# Patient Record
Sex: FEMALE | Race: WHITE | NOT HISPANIC OR LATINO | Employment: PART TIME | URBAN - METROPOLITAN AREA
[De-identification: names, ages, dates, MRNs, and addresses within clinical notes are randomized per-mention and may not be internally consistent; named-entity substitution may affect disease eponyms.]

---

## 2017-10-26 ENCOUNTER — COMMUNICATION - HEALTHEAST (OUTPATIENT)
Dept: TELEHEALTH | Facility: CLINIC | Age: 32
End: 2017-10-26

## 2017-10-26 ENCOUNTER — OFFICE VISIT - HEALTHEAST (OUTPATIENT)
Dept: FAMILY MEDICINE | Facility: CLINIC | Age: 32
End: 2017-10-26

## 2017-10-26 DIAGNOSIS — R31.9 BLOOD IN URINE: ICD-10-CM

## 2017-10-26 DIAGNOSIS — N39.0 UTI (URINARY TRACT INFECTION): ICD-10-CM

## 2017-10-26 ASSESSMENT — MIFFLIN-ST. JEOR: SCORE: 1337.71

## 2017-11-15 ENCOUNTER — OFFICE VISIT (OUTPATIENT)
Dept: OBGYN | Facility: CLINIC | Age: 32
End: 2017-11-15
Payer: COMMERCIAL

## 2017-11-15 VITALS
WEIGHT: 136 LBS | SYSTOLIC BLOOD PRESSURE: 120 MMHG | DIASTOLIC BLOOD PRESSURE: 80 MMHG | BODY MASS INDEX: 21.86 KG/M2 | HEIGHT: 66 IN

## 2017-11-15 DIAGNOSIS — Z01.411 ENCOUNTER FOR GYNECOLOGICAL EXAMINATION WITH ABNORMAL FINDING: Primary | ICD-10-CM

## 2017-11-15 DIAGNOSIS — R10.2 PELVIC PAIN IN FEMALE: ICD-10-CM

## 2017-11-15 PROCEDURE — 99385 PREV VISIT NEW AGE 18-39: CPT | Performed by: OBSTETRICS & GYNECOLOGY

## 2017-11-15 PROCEDURE — 99213 OFFICE O/P EST LOW 20 MIN: CPT | Mod: 25 | Performed by: OBSTETRICS & GYNECOLOGY

## 2017-11-15 RX ORDER — IBUPROFEN 100 MG/5ML
10 SUSPENSION, ORAL (FINAL DOSE FORM) ORAL EVERY 6 HOURS PRN
COMMUNITY
End: 2023-02-23

## 2017-11-15 NOTE — PROGRESS NOTES
Paula Larson is a 31 year old P0 female who presents for annual exam.  In addition to routine health maintenance, she also wishes to discuss pelvic pain, decreased libido.  20 minutes were spent in counseling regarding this issue (in addition to routine health maintenance.)   A Mirena was placed August 2016 (she had a substantial vasovagal response to this), had no periods x 4 months, but noted significant decreased libido.  About 4 months ago, she began having pelvic cramping, as well as spotting.  She took oral contraceptive pills in that past but would not be eager to resume use, just didn't feel well with them.  Would plan condoms if IUD removed,  ultimately plans vasectomy.  Moved back to [place] from [place], had pap there 8/2016, normal.      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.  Good general health, had surgery for thoracic outlet syndrome at age 16.  Occasional UTI.      Exam today normal, IUD string present.  Will plan ultrasound to localize IUD, assess adnexa.  Call for results.  If she wishes the IUD removed, would offer her Valium/Percocet prior to procedure (she knows she needs a ), and would plan it at [place].      Menses are irregular and normal lasting 1 days.  Menses flow: normal and 0.  No LMP recorded. Patient is not currently having periods (Reason: IUD).. Using IUD for contraception.  She is not currently considering pregnancy.  Besides routine health maintenance, she would like to discuss pelvic pain, IUD.    GYNECOLOGIC HISTORY:  Menarche:         Paula is sexually active with 1male partner(s) and is currently in monogamous relationship with .    History sexually transmitted infections:No STD history  STI testing offered?  Declined  ORLY exposure: No  History of abnormal Pap smear: NO - age 30- 65 PAP every 3 years recommended  Family history of breast CA: Yes (Please explain): grandmother  Family history of uterine/ovarian CA: No    Family  history of colon CA: Yes (Please explain): p grandfather    HEALTH MAINTENANCE:  Cholesterol: (No results found for: CHOL History of abnormal lipids: No  Mammo: 0 . History of abnormal Mammo: Not applicable.  Regular Self Breast Exams: Yes  Calcium/Vitamin D intake: source:  dairy, dietary supplement(s) Adequate? Yes  TSH: (No results found for: TSH )  Pap; (No results found for: PAP )    HISTORY:  Obstetric History     No data available        No past medical history on file.  No past surgical history on file.  No family history on file.  Social History     Social History     Marital status:      Spouse name: N/A     Number of children: N/A     Years of education: N/A     Social History Main Topics     Smoking status: Not on file     Smokeless tobacco: Not on file     Alcohol use Not on file     Drug use: Not on file     Sexual activity: Not on file     Other Topics Concern     Not on file     Social History Narrative       Current Outpatient Prescriptions:      levonorgestrel (MIRENA) 20 MCG/24HR IUD, 1 each by Intrauterine route once, Disp: , Rfl:      ibuprofen (ADVIL/MOTRIN) 100 MG/5ML suspension, Take 10 mg/kg by mouth every 6 hours as needed for fever or moderate pain, Disp: , Rfl:      Allergies   Allergen Reactions     Macrobid [Nitrofurantoin]      Sulfa Drugs        Past medical, surgical, social and family history were reviewed and updated in EPIC.    ROS:   C:     NEGATIVE for fever, chills, change in weight  I:       NEGATIVE for worrisome rashes, moles or lesions  E:     NEGATIVE for vision changes or irritation  E/M: NEGATIVE for ear, mouth and throat problems  R:     NEGATIVE for significant cough or SOB  CV:   NEGATIVE for chest pain, palpitations or peripheral edema  GI:     NEGATIVE for nausea, abdominal pain, heartburn, or change in bowel habits  :   NEGATIVE for frequency, dysuria, hematuria, vaginal discharge, or irregular bleeding  M:     NEGATIVE for significant arthralgias or  "myalgia  N:      NEGATIVE for weakness, dizziness or paresthesias  E:      NEGATIVE for temperature intolerance, skin/hair changes  P:      NEGATIVE for changes in mood or affect.    EXAM:  /80  Ht 5' 6\" (1.676 m)  Wt 136 lb (61.7 kg)  Breastfeeding? No  BMI 21.95 kg/m2   BMI: Body mass index is 21.95 kg/(m^2).  Constitutional: healthy, alert and no distress  Head: Normocephalic. No masses, lesions, tenderness or abnormalities  Neck: Neck supple. Trachea midline. No adenopathy. Thyroid symmetric, normal size.   Cardiovascular: RRR.   Respiratory: Negative.   Breast: No nodularity, asymmetry or nipple discharge bilaterally.  Gastrointestinal: Abdomen soft, non-tender, non-distended. No masses, organomegaly.  :  Vulva:  No external lesions, normal female hair distribution, no inguinal adenopathy.    Urethra:  Midline, non-tender, well supported, no discharge  Vagina:  Moist, pink, no abnormal discharge, no lesions  Uterus:  Normal size, non-tender, freely mobile  Ovaries:  No masses appreciated, non-tender, mobile  Some fullness in the right adnexa, no tenderness  Rectal Exam: deferred  Musculoskeletal: extremities normal  Skin: no suspicious lesions or rashes  Psychiatric: Affect appropriate, cooperative,mentation appears normal.     COUNSELING:      has no tobacco history on file.    Body mass index is 21.95 kg/(m^2).      FRAX Risk Assessment    ASSESSMENT:  31 year old female with satisfactory annual exam  (Z01.411) Encounter for gynecological examination with abnormal finding  (primary encounter diagnosis)  Comment:   Plan:     (R10.2) Pelvic pain in female  Comment:   Plan: US Pelvic Complete w Transvaginal            "

## 2017-11-15 NOTE — NURSING NOTE
"Chief Complaint   Patient presents with     Physical       Initial /80  Ht 5' 6\" (1.676 m)  Wt 136 lb (61.7 kg)  Breastfeeding? No  BMI 21.95 kg/m2 Estimated body mass index is 21.95 kg/(m^2) as calculated from the following:    Height as of this encounter: 5' 6\" (1.676 m).    Weight as of this encounter: 136 lb (61.7 kg).  BP completed using cuff size: regular    No obstetric history on file.    The following HM Due: NONE      The following patient reported/Care Every where data was sent to:  P ABSTRACT QUALITY INITIATIVES [[id number]]  none     n/a               "

## 2017-11-15 NOTE — MR AVS SNAPSHOT
"              After Visit Summary   11/15/2017    Paula Larson    MRN: [id number]           Patient Information     Date Of Birth          1985        Visit Information        Provider Department      11/15/2017 1:00 PM Anne Jang MD [facility]        Today's Diagnoses     Encounter for gynecological examination with abnormal finding    -  1    Pelvic pain in female           Follow-ups after your visit        Future tests that were ordered for you today     Open Future Orders        Priority Expected Expires Ordered    US Pelvic Complete w Transvaginal Routine  11/15/2018 11/15/2017            Who to contact     If you have questions or need follow up information about today's clinic visit or your schedule please contact [facility] directly at [phone].  Normal or non-critical lab and imaging results will be communicated to you by MyChart, letter or phone within 4 business days after the clinic has received the results. If you do not hear from us within 7 days, please contact the clinic through Ambarellahart or phone. If you have a critical or abnormal lab result, we will notify you by phone as soon as possible.  Submit refill requests through Adan or call your pharmacy and they will forward the refill request to us. Please allow 3 business days for your refill to be completed.          Additional Information About Your Visit        Ambarellahart Information     Adan lets you send messages to your doctor, view your test results, renew your prescriptions, schedule appointments and more. To sign up, go to [email or web address] . Click on \"Log in\" on the left side of the screen, which will take you to the Welcome page. Then click on \"Sign up Now\" on the right side of the page.     You will be asked to enter the access code listed below, as well as some personal information. Please follow the directions to create your username and password.     Your access code is: " "[email or web address]  Expires: 2018  3:06 PM     Your access code will  in 90 days. If you need help or a new code, please call your [place] clinic or [phone].        Care EveryWhere ID     This is your Care EveryWhere ID. This could be used by other organizations to access your [place] medical records  [id number]        Your Vitals Were     Height Breastfeeding? BMI (Body Mass Index)             5' 6\" (1.676 m) No 21.95 kg/m2          Blood Pressure from Last 3 Encounters:   11/15/17 120/80    Weight from Last 3 Encounters:   11/15/17 136 lb (61.7 kg)               Primary Care Provider    None Specified       No primary provider on file.        Equal Access to Services     JESSICA DEGROOT : Sammie He, wabillda luqadaha, qaybta kaalmada gerardoyada, bakari nicole . So [facility] [phone].    ATENCIÓN: Si habla español, tiene a boateng disposición servicios gratuitos de asistencia lingüística. Llame al [phone].    We comply with applicable federal civil rights laws and [place] laws. We do not discriminate on the basis of race, color, national origin, age, disability, sex, sexual orientation, or gender identity.            Thank you!     Thank you for choosing [facility]  for your care. Our goal is always to provide you with excellent care. Hearing back from our patients is one way we can continue to improve our services. Please take a few minutes to complete the written survey that you may receive in the mail after your visit with us. Thank you!             Your Updated Medication List - Protect others around you: Learn how to safely use, store and throw away your medicines at www.disposemymeds.org.          This list is accurate as of: 11/15/17  3:06 PM.  Always use your most recent med list.                   Brand Name Dispense Instructions for use Diagnosis    ibuprofen 100 MG/5ML suspension    ADVIL/MOTRIN     Take 10 mg/kg by mouth every 6 hours " as needed for fever or moderate pain        levonorgestrel 20 MCG/24HR IUD    MIRENA     1 each by Intrauterine route once

## 2017-11-21 ENCOUNTER — RADIANT APPOINTMENT (OUTPATIENT)
Dept: ULTRASOUND IMAGING | Facility: CLINIC | Age: 32
End: 2017-11-21
Attending: OBSTETRICS & GYNECOLOGY
Payer: COMMERCIAL

## 2017-11-21 DIAGNOSIS — R10.2 PELVIC PAIN IN FEMALE: ICD-10-CM

## 2017-11-21 PROCEDURE — 76830 TRANSVAGINAL US NON-OB: CPT | Performed by: OBSTETRICS & GYNECOLOGY

## 2017-12-07 ENCOUNTER — OFFICE VISIT (OUTPATIENT)
Dept: OBGYN | Facility: CLINIC | Age: 32
End: 2017-12-07
Payer: COMMERCIAL

## 2017-12-07 VITALS
WEIGHT: 140 LBS | SYSTOLIC BLOOD PRESSURE: 144 MMHG | BODY MASS INDEX: 22.6 KG/M2 | DIASTOLIC BLOOD PRESSURE: 94 MMHG | OXYGEN SATURATION: 100 % | HEART RATE: 80 BPM

## 2017-12-07 DIAGNOSIS — Z30.09 ENCOUNTER FOR OTHER GENERAL COUNSELING OR ADVICE ON CONTRACEPTION: ICD-10-CM

## 2017-12-07 DIAGNOSIS — F41.9 ANXIETY: Primary | ICD-10-CM

## 2017-12-07 DIAGNOSIS — D25.9 UTERINE LEIOMYOMA, UNSPECIFIED LOCATION: ICD-10-CM

## 2017-12-07 PROCEDURE — 99214 OFFICE O/P EST MOD 30 MIN: CPT | Performed by: OBSTETRICS & GYNECOLOGY

## 2017-12-07 RX ORDER — ACETAMINOPHEN AND CODEINE PHOSPHATE 120; 12 MG/5ML; MG/5ML
1 SOLUTION ORAL DAILY
Qty: 84 TABLET | Refills: 3 | Status: SHIPPED | OUTPATIENT
Start: 2017-12-07 | End: 2023-02-23

## 2017-12-07 RX ORDER — DIAZEPAM 10 MG
10 TABLET ORAL EVERY 6 HOURS PRN
Qty: 1 TABLET | Refills: 0 | Status: SHIPPED | OUTPATIENT
Start: 2017-12-07 | End: 2023-02-23

## 2017-12-07 RX ORDER — HYDROCODONE BITARTRATE AND ACETAMINOPHEN 5; 325 MG/1; MG/1
1-2 TABLET ORAL EVERY 4 HOURS PRN
Qty: 2 TABLET | Refills: 0 | Status: SHIPPED | OUTPATIENT
Start: 2017-12-07 | End: 2023-02-23

## 2017-12-07 NOTE — NURSING NOTE
"Chief Complaint   Patient presents with     RECHECK       Initial There were no vitals taken for this visit. Estimated body mass index is 21.95 kg/(m^2) as calculated from the following:    Height as of 11/15/17: 5' 6\" (1.676 m).    Weight as of 11/15/17: 136 lb (61.7 kg).  BP completed using cuff size: regular    No obstetric history on file.    The following HM Due: NONE      The following patient reported/Care Every where data was sent to:  P ABSTRACT QUALITY INITIATIVES [[id number]]  none    n/a              "

## 2017-12-07 NOTE — MR AVS SNAPSHOT
"              After Visit Summary   12/7/2017    Paula Larson    MRN: [id number]           Patient Information     Date Of Birth          1985        Visit Information        Provider Department      12/7/2017 9:45 AM Anne Jang MD [facility]        Today's Diagnoses     Anxiety    -  1    Encounter for other general counseling or advice on contraception        Uterine leiomyoma, unspecified location           Follow-ups after your visit        Future tests that were ordered for you today     Open Future Orders        Priority Expected Expires Ordered    US Pelvic Complete w Transvaginal Routine  12/7/2018 12/7/2017            Who to contact     If you have questions or need follow up information about today's clinic visit or your schedule please contact [facility] directly at [phone].  Normal or non-critical lab and imaging results will be communicated to you by Wanderahart, letter or phone within 4 business days after the clinic has received the results. If you do not hear from us within 7 days, please contact the clinic through Wanderahart or phone. If you have a critical or abnormal lab result, we will notify you by phone as soon as possible.  Submit refill requests through Anametrix or call your pharmacy and they will forward the refill request to us. Please allow 3 business days for your refill to be completed.          Additional Information About Your Visit        WanderaharEko Devices Information     Anametrix lets you send messages to your doctor, view your test results, renew your prescriptions, schedule appointments and more. To sign up, go to [email or web address] . Click on \"Log in\" on the left side of the screen, which will take you to the Welcome page. Then click on \"Sign up Now\" on the right side of the page.     You will be asked to enter the access code listed below, as well as some personal information. Please follow the directions to create your username and password.   "   Your access code is: [email or web address]  Expires: 2018  3:06 PM     Your access code will  in 90 days. If you need help or a new code, please call your [place] clinic or [phone].        Care EveryWhere ID     This is your Care EveryWhere ID. This could be used by other organizations to access your [place] medical records  [id number]        Your Vitals Were     Pulse Pulse Oximetry Breastfeeding? BMI (Body Mass Index)          80 100% No 22.6 kg/m2         Blood Pressure from Last 3 Encounters:   17 (!) 144/94   11/15/17 120/80    Weight from Last 3 Encounters:   17 140 lb (63.5 kg)   11/15/17 136 lb (61.7 kg)                 Today's Medication Changes          These changes are accurate as of: 17 11:21 AM.  If you have any questions, ask your nurse or doctor.               Start taking these medicines.        Dose/Directions    diazepam 10 MG tablet   Commonly known as:  VALIUM   Used for:  Anxiety   Started by:  Anne Jang MD        Dose:  10 mg   Take 1 tablet (10 mg) by mouth every 6 hours as needed for anxiety or sleep Take 30-60 minutes before procedure.  Do not operate a vehicle after taking this medication.   Quantity:  1 tablet   Refills:  0       HYDROcodone-acetaminophen 5-325 MG per tablet   Commonly known as:  NORCO   Used for:  Anxiety   Started by:  Anne Jang MD        Dose:  1-2 tablet   Take 1-2 tablets by mouth every 4 hours as needed for moderate to severe pain Take 1-2 tablets 30-60 minutes before procedure   Quantity:  2 tablet   Refills:  0       naloxone nasal spray   Commonly known as:  NARCAN   Used for:  Anxiety   Started by:  Anne Jang MD        Dose:  4 mg   Spray 1 spray (4 mg) into one nostril alternating nostrils as needed for opioid reversal every 2-3 minutes until assistance arrives   Quantity:  0.2 mL   Refills:  0       norethindrone 0.35 MG per tablet   Commonly known as:  MICRONOR   Used for:  Encounter for other general counseling or  advice on contraception   Started by:  Anne Jang MD        Dose:  1 tablet   Take 1 tablet (0.35 mg) by mouth daily   Quantity:  84 tablet   Refills:  3            Where to get your medicines      These medications were sent to [facility] - [place], [place] - [address]  [address], [place] [address]     Phone:  [phone]     naloxone nasal spray    norethindrone 0.35 MG per tablet         Some of these will need a paper prescription and others can be bought over the counter.  Ask your nurse if you have questions.     Bring a paper prescription for each of these medications     diazepam 10 MG tablet    HYDROcodone-acetaminophen 5-325 MG per tablet                Primary Care Provider Office Phone # Fax #    [facility] [phone] [address]  [facility] [address]        Equal Access to Services     JESSICA DEGROOT : Hadii aad ku hadasho Soliliana, waaxda luqadaha, qaybta kaalmada adejoyceyaye, bakari dupree. So [facility] [phone].    ATENCIÓN: Si habla español, tiene a boateng disposición servicios gratuitos de asistencia lingüística. Raghavendra al [phone].    We comply with applicable federal civil rights laws and [place] laws. We do not discriminate on the basis of race, color, national origin, age, disability, sex, sexual orientation, or gender identity.            Thank you!     Thank you for choosing [facility]  for your care. Our goal is always to provide you with excellent care. Hearing back from our patients is one way we can continue to improve our services. Please take a few minutes to complete the written survey that you may receive in the mail after your visit with us. Thank you!             Your Updated Medication List - Protect others around you: Learn how to safely use, store and throw away your medicines at www.disposemymeds.org.          This list is accurate as of: 12/7/17  11:21 AM.  Always use your most recent med list.                   Brand Name Dispense Instructions for use Diagnosis    diazepam 10 MG tablet    VALIUM    1 tablet    Take 1 tablet (10 mg) by mouth every 6 hours as needed for anxiety or sleep Take 30-60 minutes before procedure.  Do not operate a vehicle after taking this medication.    Anxiety       HYDROcodone-acetaminophen 5-325 MG per tablet    NORCO    2 tablet    Take 1-2 tablets by mouth every 4 hours as needed for moderate to severe pain Take 1-2 tablets 30-60 minutes before procedure    Anxiety       ibuprofen 100 MG/5ML suspension    ADVIL/MOTRIN     Take 10 mg/kg by mouth every 6 hours as needed for fever or moderate pain        levonorgestrel 20 MCG/24HR IUD    MIRENA     1 each by Intrauterine route once        naloxone nasal spray    NARCAN    0.2 mL    Spray 1 spray (4 mg) into one nostril alternating nostrils as needed for opioid reversal every 2-3 minutes until assistance arrives    Anxiety       norethindrone 0.35 MG per tablet    MICRONOR    84 tablet    Take 1 tablet (0.35 mg) by mouth daily    Encounter for other general counseling or advice on contraception

## 2017-12-07 NOTE — PROGRESS NOTES
SUBJECTIVE:  Paula Larson is a 32 year old  who presents to discuss pelvic pain, ultrasound.  See prior GYN note.  We reviewed the ultrasound done 17.  The ovaries were normal, the IUD correctly placed.  Multiple fibroids were seen, one 4-5 cm, two smaller.       OBJECTIVE: BP (!) 144/94  Pulse 80  Wt 140 lb (63.5 kg)  SpO2 100%  Breastfeeding? No  BMI 22.6 kg/m2 Patient was not otherwise examined.      ASSESSMENT: Pelvic pain.  Uterine fibroids.  IUD in place.      PLAN: Discussed fibroids, possible incidental finding, possibility for growth/bleeding problems.  Will plan ultrasound in about 6 months, call for the results, or call prn problems.  Discussed IUD.  Since the IUD has not really helped her pain despite lightening her bleeding, she would like it removed.  The insertion was painful, with vasovagal reaction.  She will premedicate with valium and Norco.  Will have a .      Please note greater than 50% of this 25 minute appointment were spent in counseling with the patient of the issues described above in the history of present illness and in the plan, including evaluation and managment of pelvic pain, fibroids, IUD.

## 2018-01-26 ENCOUNTER — OFFICE VISIT (OUTPATIENT)
Dept: OBGYN | Facility: CLINIC | Age: 33
End: 2018-01-26
Payer: COMMERCIAL

## 2018-01-26 VITALS
DIASTOLIC BLOOD PRESSURE: 98 MMHG | HEART RATE: 81 BPM | HEIGHT: 66 IN | OXYGEN SATURATION: 99 % | SYSTOLIC BLOOD PRESSURE: 144 MMHG | WEIGHT: 141 LBS | BODY MASS INDEX: 22.66 KG/M2

## 2018-01-26 DIAGNOSIS — Z30.432 ENCOUNTER FOR IUD REMOVAL: Primary | ICD-10-CM

## 2018-01-26 LAB — BETA HCG QUAL IFA URINE: NEGATIVE

## 2018-01-26 PROCEDURE — 84703 CHORIONIC GONADOTROPIN ASSAY: CPT | Performed by: OBSTETRICS & GYNECOLOGY

## 2018-01-26 PROCEDURE — 58301 REMOVE INTRAUTERINE DEVICE: CPT | Performed by: OBSTETRICS & GYNECOLOGY

## 2018-01-26 NOTE — PROGRESS NOTES
"    Paula Larson is a  who presents for IUD removal.  See prior notes.  She has taken valium and Norco (history of anxiety, has had multiple vagal reactions under stress).  Informed consent obtained.       OBJECTIVE:  Healthy female in NAD.  BP (!) 144/98  Pulse 81  Ht 5' 6\" (1.676 m)  Wt 141 lb (64 kg)  SpO2 99%  Breastfeeding? No  BMI 22.76 kg/m2     Speculum is placed in the vagina, and the IUD string is grasped with a ring forceps, and removed without difficulty.      She tolerated this well.     ASSESSMENT:  IUD removal.    PLAN: RTC as needed.   She plans to observe menses off hormonal contraception for a while, has rx for Micronor if she wishes to begin this.    "

## 2018-01-26 NOTE — NURSING NOTE
"Chief Complaint   Patient presents with     IUD     removal        Initial BP (!) 144/98  Pulse 81  Ht 5' 6\" (1.676 m)  Wt 141 lb (64 kg)  SpO2 99%  Breastfeeding? No  BMI 22.76 kg/m2 Estimated body mass index is 22.76 kg/(m^2) as calculated from the following:    Height as of this encounter: 5' 6\" (1.676 m).    Weight as of this encounter: 141 lb (64 kg).  BP completed using cuff size: regular        The following HM Due: NONE      The following patient reported/Care Every where data was sent to:  P ABSTRACT QUALITY INITIATIVES [[id number]]  none      n/a              "

## 2018-01-26 NOTE — MR AVS SNAPSHOT
"              After Visit Summary   2018    Paula Larson    MRN: [id number]           Patient Information     Date Of Birth          1985        Visit Information        Provider Department      2018 10:00 AM Anne Jang MD [facility]        Today's Diagnoses     Encounter for IUD removal    -  1       Follow-ups after your visit        Who to contact     If you have questions or need follow up information about today's clinic visit or your schedule please contact [facility] directly at [phone].  Normal or non-critical lab and imaging results will be communicated to you by MyChart, letter or phone within 4 business days after the clinic has received the results. If you do not hear from us within 7 days, please contact the clinic through [employer] or phone. If you have a critical or abnormal lab result, we will notify you by phone as soon as possible.  Submit refill requests through Equ[employer] or call your pharmacy and they will forward the refill request to us. Please allow 3 business days for your refill to be completed.          Additional Information About Your Visit        MyChart Information     [email or web address] lets you send messages to your doctor, view your test results, renew your prescriptions, schedule appointments and more. To sign up, go to [email or web address] . Click on \"Log in\" on the left side of the screen, which will take you to the Welcome page. Then click on \"Sign up Now\" on the right side of the page.     You will be asked to enter the access code listed below, as well as some personal information. Please follow the directions to create your username and password.     Your access code is: [email or web address]  Expires: 2018  3:06 PM     Your access code will  in 90 days. If you need help or a new code, please call your [facility] or [phone].        Care EveryWhere ID     This is your Care EveryWhere ID. This could be used by other " "organizations to access your [place] medical records  [id number]        Your Vitals Were     Pulse Height Pulse Oximetry Breastfeeding? BMI (Body Mass Index)       81 5' 6\" (1.676 m) 99% No 22.76 kg/m2        Blood Pressure from Last 3 Encounters:   01/26/18 (!) 144/98   12/07/17 (!) 144/94   11/15/17 120/80    Weight from Last 3 Encounters:   01/26/18 141 lb (64 kg)   12/07/17 140 lb (63.5 kg)   11/15/17 136 lb (61.7 kg)              We Performed the Following     Beta HCG qual IFA urine     REMOVE INTRAUTERINE DEVICE        Primary Care Provider Office Phone # Fax #    [facility] [phone] [address]  [facility] [address]        Equal Access to Services     JESSICA DEGROOT : Hadii marcellus brice Soliliana, waaxda luqadaha, qaybta kaalmada fernie, bakari nicole . So [facility] [phone].    ATENCIÓN: Si habla español, tiene a boateng disposición servicios gratuitos de asistencia lingüística. Raghavendra al [phone].    We comply with applicable federal civil rights laws and [place] laws. We do not discriminate on the basis of race, color, national origin, age, disability, sex, sexual orientation, or gender identity.            Thank you!     Thank you for choosing [facility]  for your care. Our goal is always to provide you with excellent care. Hearing back from our patients is one way we can continue to improve our services. Please take a few minutes to complete the written survey that you may receive in the mail after your visit with us. Thank you!             Your Updated Medication List - Protect others around you: Learn how to safely use, store and throw away your medicines at www.disposemymeds.org.          This list is accurate as of 1/26/18 10:28 AM.  Always use your most recent med list.                   Brand Name Dispense Instructions for use Diagnosis    diazepam 10 MG tablet    VALIUM    1 tablet    Take 1 tablet (10 mg) by " mouth every 6 hours as needed for anxiety or sleep Take 30-60 minutes before procedure.  Do not operate a vehicle after taking this medication.    Anxiety       HYDROcodone-acetaminophen 5-325 MG per tablet    NORCO    2 tablet    Take 1-2 tablets by mouth every 4 hours as needed for moderate to severe pain Take 1-2 tablets 30-60 minutes before procedure    Anxiety       ibuprofen 100 MG/5ML suspension    ADVIL/MOTRIN     Take 10 mg/kg by mouth every 6 hours as needed for fever or moderate pain        levonorgestrel 20 MCG/24HR IUD    MIRENA     1 each by Intrauterine route once        naloxone nasal spray    NARCAN    0.2 mL    Spray 1 spray (4 mg) into one nostril alternating nostrils as needed for opioid reversal every 2-3 minutes until assistance arrives    Anxiety       norethindrone 0.35 MG per tablet    MICRONOR    84 tablet    Take 1 tablet (0.35 mg) by mouth daily    Encounter for other general counseling or advice on contraception

## 2018-06-18 ENCOUNTER — OFFICE VISIT - HEALTHEAST (OUTPATIENT)
Dept: FAMILY MEDICINE | Facility: CLINIC | Age: 33
End: 2018-06-18

## 2018-06-18 DIAGNOSIS — N39.0 UTI (URINARY TRACT INFECTION): ICD-10-CM

## 2018-06-18 LAB
BACTERIA #/AREA URNS HPF: ABNORMAL HPF
RBC #/AREA URNS AUTO: ABNORMAL HPF
SQUAMOUS #/AREA URNS AUTO: ABNORMAL LPF
WBC #/AREA URNS AUTO: ABNORMAL HPF

## 2018-06-19 LAB — BACTERIA SPEC CULT: NO GROWTH

## 2018-06-20 ENCOUNTER — COMMUNICATION - HEALTHEAST (OUTPATIENT)
Dept: FAMILY MEDICINE | Facility: CLINIC | Age: 33
End: 2018-06-20

## 2018-11-22 ENCOUNTER — OFFICE VISIT - HEALTHEAST (OUTPATIENT)
Dept: FAMILY MEDICINE | Facility: CLINIC | Age: 33
End: 2018-11-22

## 2018-11-22 DIAGNOSIS — R30.0 DYSURIA: ICD-10-CM

## 2018-11-22 LAB
ALBUMIN UR-MCNC: NEGATIVE MG/DL
APPEARANCE UR: CLEAR
BILIRUB UR QL STRIP: NEGATIVE
COLOR UR AUTO: YELLOW
GLUCOSE UR STRIP-MCNC: NEGATIVE MG/DL
HGB UR QL STRIP: NEGATIVE
KETONES UR STRIP-MCNC: NEGATIVE MG/DL
LEUKOCYTE ESTERASE UR QL STRIP: NEGATIVE
NITRATE UR QL: NEGATIVE
PH UR STRIP: 7 [PH] (ref 5–8)
SP GR UR STRIP: 1.02 (ref 1–1.03)
UROBILINOGEN UR STRIP-ACNC: NORMAL

## 2018-11-23 LAB — BACTERIA SPEC CULT: NO GROWTH

## 2019-05-15 ENCOUNTER — OFFICE VISIT - HEALTHEAST (OUTPATIENT)
Dept: INTERNAL MEDICINE | Facility: CLINIC | Age: 34
End: 2019-05-15

## 2019-05-15 ENCOUNTER — COMMUNICATION - HEALTHEAST (OUTPATIENT)
Dept: INTERNAL MEDICINE | Facility: CLINIC | Age: 34
End: 2019-05-15

## 2019-05-15 ENCOUNTER — AMBULATORY - HEALTHEAST (OUTPATIENT)
Dept: MULTI SPECIALTY CLINIC | Facility: CLINIC | Age: 34
End: 2019-05-15

## 2019-05-15 ENCOUNTER — COMMUNICATION - HEALTHEAST (OUTPATIENT)
Dept: TELEHEALTH | Facility: CLINIC | Age: 34
End: 2019-05-15

## 2019-05-15 ENCOUNTER — OFFICE VISIT (OUTPATIENT)
Dept: FAMILY MEDICINE | Facility: CLINIC | Age: 34
End: 2019-05-15
Payer: COMMERCIAL

## 2019-05-15 VITALS
TEMPERATURE: 98.8 F | RESPIRATION RATE: 16 BRPM | DIASTOLIC BLOOD PRESSURE: 96 MMHG | BODY MASS INDEX: 21.38 KG/M2 | HEIGHT: 66 IN | OXYGEN SATURATION: 100 % | WEIGHT: 133 LBS | SYSTOLIC BLOOD PRESSURE: 143 MMHG | HEART RATE: 68 BPM

## 2019-05-15 DIAGNOSIS — N92.0 MENORRHAGIA WITH REGULAR CYCLE: ICD-10-CM

## 2019-05-15 DIAGNOSIS — N94.6 DYSMENORRHEA: ICD-10-CM

## 2019-05-15 DIAGNOSIS — G43.009 NONINTRACTABLE MIGRAINE, UNSPECIFIED MIGRAINE TYPE: ICD-10-CM

## 2019-05-15 DIAGNOSIS — Z23 NEED FOR TETANUS BOOSTER: ICD-10-CM

## 2019-05-15 DIAGNOSIS — Z13.220 SCREENING FOR HYPERLIPIDEMIA: ICD-10-CM

## 2019-05-15 DIAGNOSIS — R03.0 ELEVATED BP WITHOUT DIAGNOSIS OF HYPERTENSION: ICD-10-CM

## 2019-05-15 DIAGNOSIS — Z80.0 FAMILY HISTORY OF COLON CANCER: ICD-10-CM

## 2019-05-15 DIAGNOSIS — Z84.1 FAMILY HISTORY OF PRIMARY IGA NEPHROPATHY: ICD-10-CM

## 2019-05-15 DIAGNOSIS — N39.0 FREQUENT UTI: Primary | ICD-10-CM

## 2019-05-15 DIAGNOSIS — Z87.440 HISTORY OF RECURRENT UTIS: ICD-10-CM

## 2019-05-15 DIAGNOSIS — L50.9 HIVES: ICD-10-CM

## 2019-05-15 DIAGNOSIS — T75.3XXS MOTION SICKNESS, SEQUELA: ICD-10-CM

## 2019-05-15 DIAGNOSIS — Z12.4 SCREENING FOR CERVICAL CANCER: ICD-10-CM

## 2019-05-15 DIAGNOSIS — D21.9 FIBROIDS: ICD-10-CM

## 2019-05-15 DIAGNOSIS — Z00.00 ROUTINE GENERAL MEDICAL EXAMINATION AT A HEALTH CARE FACILITY: ICD-10-CM

## 2019-05-15 DIAGNOSIS — D68.51 HETEROZYGOUS FACTOR V LEIDEN MUTATION (H): ICD-10-CM

## 2019-05-15 LAB
ALBUMIN UR-MCNC: NEGATIVE MG/DL
ANION GAP SERPL CALCULATED.3IONS-SCNC: 13 MMOL/L (ref 5–18)
APPEARANCE UR: CLEAR
BILIRUB UR QL STRIP: NEGATIVE
BUN SERPL-MCNC: 16 MG/DL (ref 8–22)
CALCIUM SERPL-MCNC: 10.2 MG/DL (ref 8.5–10.5)
CHLORIDE BLD-SCNC: 104 MMOL/L (ref 98–107)
CO2 SERPL-SCNC: 22 MMOL/L (ref 22–31)
COLOR UR AUTO: NORMAL
CREAT SERPL-MCNC: 0.75 MG/DL (ref 0.6–1.1)
ERYTHROCYTE [DISTWIDTH] IN BLOOD BY AUTOMATED COUNT: 12.2 % (ref 11–14.5)
FERRITIN SERPL-MCNC: 84 NG/ML (ref 10–130)
GFR SERPL CREATININE-BSD FRML MDRD: >60 ML/MIN/1.73M2
GLUCOSE BLD-MCNC: 90 MG/DL (ref 70–125)
GLUCOSE UR STRIP-MCNC: NEGATIVE MG/DL
HCT VFR BLD AUTO: 38.6 % (ref 35–47)
HGB BLD-MCNC: 12.7 G/DL (ref 12–16)
HGB UR QL STRIP: NEGATIVE
KETONES UR STRIP-MCNC: NEGATIVE MG/DL
LEUKOCYTE ESTERASE UR QL STRIP: NEGATIVE
MCH RBC QN AUTO: 31.4 PG (ref 27–34)
MCHC RBC AUTO-ENTMCNC: 32.9 G/DL (ref 32–36)
MCV RBC AUTO: 96 FL (ref 80–100)
NITRATE UR QL: NEGATIVE
PAP SMEAR - HIM PATIENT REPORTED: NORMAL
PH UR STRIP: 6 [PH] (ref 4.5–8)
PLATELET # BLD AUTO: 243 THOU/UL (ref 140–440)
PMV BLD AUTO: 11.4 FL (ref 8.5–12.5)
POTASSIUM BLD-SCNC: 3.8 MMOL/L (ref 3.5–5)
RBC # BLD AUTO: 4.04 MILL/UL (ref 3.8–5.4)
SODIUM SERPL-SCNC: 139 MMOL/L (ref 136–145)
SP GR UR STRIP: 1.01 (ref 1–1.03)
TSH SERPL DL<=0.005 MIU/L-ACNC: 2.85 UIU/ML (ref 0.3–5)
UROBILINOGEN UR STRIP-ACNC: NORMAL
WBC: 10.7 THOU/UL (ref 4–11)

## 2019-05-15 PROCEDURE — 90714 TD VACC NO PRESV 7 YRS+ IM: CPT | Performed by: FAMILY MEDICINE

## 2019-05-15 PROCEDURE — 99204 OFFICE O/P NEW MOD 45 MIN: CPT | Mod: 25 | Performed by: FAMILY MEDICINE

## 2019-05-15 PROCEDURE — G0145 SCR C/V CYTO,THINLAYER,RESCR: HCPCS | Performed by: FAMILY MEDICINE

## 2019-05-15 PROCEDURE — 90471 IMMUNIZATION ADMIN: CPT | Performed by: FAMILY MEDICINE

## 2019-05-15 PROCEDURE — 87624 HPV HI-RISK TYP POOLED RSLT: CPT | Performed by: FAMILY MEDICINE

## 2019-05-15 RX ORDER — FEXOFENADINE HCL 180 MG/1
180 TABLET ORAL DAILY
COMMUNITY

## 2019-05-15 ASSESSMENT — MIFFLIN-ST. JEOR
SCORE: 1321.06
SCORE: 1333.18

## 2019-05-15 NOTE — PROGRESS NOTES
SUBJECTIVE:   Paula Larson is a 33 year old female who presents to clinic today for the following   health issues:    Pt is here for a pap only and breast exam. Requesting medication     1.  Due for pap smear.  Last was 2016.  Denies history of abnormal paps.  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     2.  Frequent UTIs: has used post-coital keflex in the past with good results, requests a prescription.  Has had 2 UTIs in the past 6 months.  [place] seems to be the main trigger.  She denies any h/o pyelonephritis or urological surgeries.      3.  Dysmenorrhea; she reports she has always had heavy, painful periods which result it significant discomfort and even fainting.  She states she has been fainting in bathrooms since age 11.  She tried OCPs but did not like the way they made her feel.  She had an IUD for a year or a little longer but she had ongoing cramping (though it did lighten the bleeding) which was not tolerable, so the IUD was removed.  She and her  do not wish to have children.  She is wondering about a surgical or other option.      4.  Due for tetanus: she had Tdap in 2008; after this she got an enlarged lymph node and her mother reminded her that she has had an allergy to the pertussis component in the past.     5.  Colon cancer in paternal grandfather at 42--no other colon cancer in the family, father has had regular colonoscopies and has not had any polyps.  Wondering if she needs to screen early.     6.  Elevated BP: she feels anxious at the doctor's office.  Hasn't checked BP outside of a clinic.   She is a pharmacist.    Additional history: as documented    Reviewed  and updated as needed this visit by clinical staff  Tobacco  Allergies  Meds  Med Hx  Surg Hx  Fam Hx  Soc Hx        Reviewed and updated as needed this visit by Provider         There is no problem list on file for this patient.    History reviewed. No  pertinent surgical history.    Social History     Tobacco Use     Smoking status: Never Smoker     Smokeless tobacco: Never Used   Substance Use Topics     Alcohol use: Yes     Family History   Problem Relation Age of Onset     IgA nephropathy Mother      Coronary Artery Disease Early Onset Father      Hypertension Father      Melanoma Sister          Current Outpatient Medications   Medication Sig Dispense Refill     Calcium-Magnesium-Vitamin D (CALCIUM 500 PO)        cephALEXin (KEFLEX) 250 MG capsule Take 1 cap by mouth once daily as needed for UTI prevention. 30 capsule 1     fexofenadine (ALLEGRA) 180 MG tablet Take 180 mg by mouth daily       vitamin D3 (CHOLECALCIFEROL) 36791 units (250 mcg) capsule Take 1 capsule by mouth daily       diazepam (VALIUM) 10 MG tablet Take 1 tablet (10 mg) by mouth every 6 hours as needed for anxiety or sleep Take 30-60 minutes before procedure.  Do not operate a vehicle after taking this medication. (Patient not taking: Reported on 1/26/2018) 1 tablet 0     HYDROcodone-acetaminophen (NORCO) 5-325 MG per tablet Take 1-2 tablets by mouth every 4 hours as needed for moderate to severe pain Take 1-2 tablets 30-60 minutes before procedure (Patient not taking: Reported on 1/26/2018) 2 tablet 0     ibuprofen (ADVIL/MOTRIN) 100 MG/5ML suspension Take 10 mg/kg by mouth every 6 hours as needed for fever or moderate pain       levonorgestrel (MIRENA) 20 MCG/24HR IUD 1 each by Intrauterine route once       naloxone (NARCAN) nasal spray Spray 1 spray (4 mg) into one nostril alternating nostrils as needed for opioid reversal every 2-3 minutes until assistance arrives (Patient not taking: Reported on 5/15/2019) 0.2 mL 0     norethindrone (MICRONOR) 0.35 MG per tablet Take 1 tablet (0.35 mg) by mouth daily (Patient not taking: Reported on 1/26/2018) 84 tablet 3     Allergies   Allergen Reactions     Macrobid [Nitrofurantoin]      Sulfa Drugs        ROS:  Constitutional, HEENT, cardiovascular,  "pulmonary, gi, gu, skin, neuro, musculoskeletal and psych systems are negative, except as otherwise noted.    OBJECTIVE:     BP (!) (P) 143/96 (BP Location: Right arm, Patient Position: Sitting, Cuff Size: Adult Regular)   Pulse 68   Temp 98.8  F (37.1  C) (Oral)   Resp 16   Ht 1.67 m (5' 5.75\")   Wt 60.3 kg (133 lb)   LMP 04/29/2019   SpO2 100%   BMI 21.63 kg/m    Body mass index is 21.63 kg/m .  GENERAL: healthy, alert and no distress  EYES: Eyes grossly normal to inspection, PERRL and conjunctivae and sclerae normal  HENT: ear canals and TM's normal, nose and mouth without ulcers or lesions  NECK: no adenopathy, no asymmetry, masses, or scars and thyroid normal to palpation  RESP: lungs clear to auscultation - no rales, rhonchi or wheezes  BREAST: normal without masses, tenderness or nipple discharge and no palpable axillary masses or adenopathy  CV: regular rate and rhythm, normal S1 S2, no S3 or S4, no murmur, click or rub, no peripheral edema and peripheral pulses strong  ABDOMEN: soft, nontender, no hepatosplenomegaly, no masses and bowel sounds normal  MS: no gross musculoskeletal defects noted, no edema  SKIN: no suspicious lesions or rashes  NEURO: Normal strength and tone, mentation intact and speech normal  PSYCH: mentation appears normal, affect normal/bright        ASSESSMENT/PLAN:             1. Frequent UTI  Prescription done for keflex, follow up if any concerns.  She is well aware of routine pre- and post-coital self care.  - cephALEXin (KEFLEX) 250 MG capsule; Take 1 cap by mouth once daily as needed for UTI prevention.  Dispense: 30 capsule; Refill: 1    2. Dysmenorrhea  Advised she discuss with OB/GYN.  This is causing considerable pain and syncope.  Perhaps she may wish to explore surgery given that she and her  do not desire to have children.  - OB/GYN REFERRAL    3. Screening for cervical cancer    - Pap imaged thin layer screen with HPV - recommended age 30 - 65 years (select " HPV order below)  - HPV High Risk Types DNA Cervical    4. Need for tetanus booster    - TD PRESERV FREE, IM (7+ YRS)    5.  Family history of colon cancer in PGF at an early age, however, no other family members affected, and so far her father's colonoscopies have been normal.  She can likely do routine screening unless her father's case changes.    6.  Elevated BP  Recommended she check outside of the clinic.  She will also recheck at her upcoming physical.     Dagmar Jones MD  [facility]

## 2019-05-15 NOTE — LETTER
May 22, 2019    Paula Neo  [address]  [place] [address]    Dear ,  This letter is regarding your recent Pap smear (cervical cancer screening) and Human Papillomavirus (HPV) test.  We are happy to inform you that your Pap smear result is normal. Cervical cancer is closely linked with certain types of HPV. Your results showed no evidence of high-risk HPV.  Therefore we recommend you return in 5 years for your next pap smear and HPV test.  You will still need to return to the clinic every year for an annual exam and other preventive tests.  If you have additional questions regarding this result, please call our registered nurse, Varsha at [phone].  Sincerely,    Dagmar Jones MD/[facility]

## 2019-05-16 PROBLEM — N39.0 FREQUENT UTI: Status: ACTIVE | Noted: 2019-05-16

## 2019-05-16 PROBLEM — N94.6 DYSMENORRHEA: Status: ACTIVE | Noted: 2019-05-16

## 2019-05-17 LAB
COPATH REPORT: NORMAL
PAP: NORMAL

## 2019-05-20 ENCOUNTER — AMBULATORY - HEALTHEAST (OUTPATIENT)
Dept: LAB | Facility: CLINIC | Age: 34
End: 2019-05-20

## 2019-05-20 DIAGNOSIS — Z13.220 SCREENING FOR HYPERLIPIDEMIA: ICD-10-CM

## 2019-05-20 LAB
CHOLEST SERPL-MCNC: 183 MG/DL
FASTING STATUS PATIENT QL REPORTED: YES
HDLC SERPL-MCNC: 78 MG/DL
LDLC SERPL CALC-MCNC: 83 MG/DL
TRIGL SERPL-MCNC: 108 MG/DL

## 2019-05-21 LAB
FINAL DIAGNOSIS: NORMAL
HPV HR 12 DNA CVX QL NAA+PROBE: NEGATIVE
HPV16 DNA SPEC QL NAA+PROBE: NEGATIVE
HPV18 DNA SPEC QL NAA+PROBE: NEGATIVE
SPECIMEN DESCRIPTION: NORMAL
SPECIMEN SOURCE CVX/VAG CYTO: NORMAL

## 2020-02-06 ENCOUNTER — COMMUNICATION - HEALTHEAST (OUTPATIENT)
Dept: INTERNAL MEDICINE | Facility: CLINIC | Age: 35
End: 2020-02-06

## 2020-02-06 DIAGNOSIS — L70.9 ACNE, UNSPECIFIED ACNE TYPE: ICD-10-CM

## 2020-03-11 ENCOUNTER — HEALTH MAINTENANCE LETTER (OUTPATIENT)
Age: 35
End: 2020-03-11

## 2020-08-25 ENCOUNTER — COMMUNICATION - HEALTHEAST (OUTPATIENT)
Dept: INTERNAL MEDICINE | Facility: CLINIC | Age: 35
End: 2020-08-25

## 2020-08-25 DIAGNOSIS — N39.0 RECURRENT UTI: ICD-10-CM

## 2020-11-12 ENCOUNTER — OFFICE VISIT - HEALTHEAST (OUTPATIENT)
Dept: TELEHEALTH | Facility: CLINIC | Age: 35
End: 2020-11-12

## 2020-11-12 DIAGNOSIS — Z20.822 CLOSE EXPOSURE TO 2019 NOVEL CORONAVIRUS: ICD-10-CM

## 2020-12-27 ENCOUNTER — HEALTH MAINTENANCE LETTER (OUTPATIENT)
Age: 35
End: 2020-12-27

## 2021-02-20 ENCOUNTER — COMMUNICATION - HEALTHEAST (OUTPATIENT)
Dept: INTERNAL MEDICINE | Facility: CLINIC | Age: 36
End: 2021-02-20

## 2021-03-12 ENCOUNTER — OFFICE VISIT - HEALTHEAST (OUTPATIENT)
Dept: INTERNAL MEDICINE | Facility: CLINIC | Age: 36
End: 2021-03-12

## 2021-03-12 DIAGNOSIS — Z13.220 SCREENING FOR HYPERLIPIDEMIA: ICD-10-CM

## 2021-03-12 DIAGNOSIS — D68.51 HETEROZYGOUS FACTOR V LEIDEN MUTATION (H): ICD-10-CM

## 2021-03-12 DIAGNOSIS — L50.9 HIVES: ICD-10-CM

## 2021-03-12 DIAGNOSIS — Z00.00 ROUTINE GENERAL MEDICAL EXAMINATION AT A HEALTH CARE FACILITY: ICD-10-CM

## 2021-03-12 DIAGNOSIS — Z13.21 ENCOUNTER FOR VITAMIN DEFICIENCY SCREENING: ICD-10-CM

## 2021-03-12 DIAGNOSIS — N39.0 RECURRENT UTI: ICD-10-CM

## 2021-03-12 DIAGNOSIS — Z13.1 SCREENING FOR DIABETES MELLITUS: ICD-10-CM

## 2021-03-12 LAB
ALBUMIN SERPL-MCNC: 4.6 G/DL (ref 3.5–5)
ALBUMIN UR-MCNC: NEGATIVE G/DL
ALP SERPL-CCNC: 48 U/L (ref 45–120)
ALT SERPL W P-5'-P-CCNC: 13 U/L (ref 0–45)
ANION GAP SERPL CALCULATED.3IONS-SCNC: 10 MMOL/L (ref 5–18)
APPEARANCE UR: CLEAR
AST SERPL W P-5'-P-CCNC: 18 U/L (ref 0–40)
BILIRUB SERPL-MCNC: 0.9 MG/DL (ref 0–1)
BILIRUB UR QL STRIP: NEGATIVE
BUN SERPL-MCNC: 13 MG/DL (ref 8–22)
CALCIUM SERPL-MCNC: 9.4 MG/DL (ref 8.5–10.5)
CHLORIDE BLD-SCNC: 104 MMOL/L (ref 98–107)
CHOLEST SERPL-MCNC: 204 MG/DL
CO2 SERPL-SCNC: 26 MMOL/L (ref 22–31)
COLOR UR AUTO: YELLOW
CREAT SERPL-MCNC: 0.77 MG/DL (ref 0.6–1.1)
ERYTHROCYTE [DISTWIDTH] IN BLOOD BY AUTOMATED COUNT: 12.6 % (ref 11–14.5)
FASTING STATUS PATIENT QL REPORTED: YES
GFR SERPL CREATININE-BSD FRML MDRD: >60 ML/MIN/1.73M2
GLUCOSE BLD-MCNC: 96 MG/DL (ref 70–125)
GLUCOSE UR STRIP-MCNC: NEGATIVE MG/DL
HBA1C MFR BLD: 5.2 %
HCT VFR BLD AUTO: 39.3 % (ref 35–47)
HDLC SERPL-MCNC: 82 MG/DL
HGB BLD-MCNC: 13 G/DL (ref 12–16)
HGB UR QL STRIP: NEGATIVE
KETONES UR STRIP-MCNC: ABNORMAL MG/DL
LDLC SERPL CALC-MCNC: 112 MG/DL
LEUKOCYTE ESTERASE UR QL STRIP: NEGATIVE
MCH RBC QN AUTO: 31.4 PG (ref 27–34)
MCHC RBC AUTO-ENTMCNC: 33.1 G/DL (ref 32–36)
MCV RBC AUTO: 95 FL (ref 80–100)
NITRATE UR QL: NEGATIVE
PH UR STRIP: 6 [PH] (ref 5–8)
PLATELET # BLD AUTO: 268 THOU/UL (ref 140–440)
PMV BLD AUTO: 10.1 FL (ref 7–10)
POTASSIUM BLD-SCNC: 4.4 MMOL/L (ref 3.5–5)
PROT SERPL-MCNC: 6.7 G/DL (ref 6–8)
RBC # BLD AUTO: 4.14 MILL/UL (ref 3.8–5.4)
SODIUM SERPL-SCNC: 140 MMOL/L (ref 136–145)
SP GR UR STRIP: 1.02 (ref 1–1.03)
TRIGL SERPL-MCNC: 49 MG/DL
TSH SERPL DL<=0.005 MIU/L-ACNC: 2.39 UIU/ML (ref 0.3–5)
UROBILINOGEN UR STRIP-ACNC: ABNORMAL
WBC: 6.1 THOU/UL (ref 4–11)

## 2021-03-12 ASSESSMENT — MIFFLIN-ST. JEOR: SCORE: 1310.78

## 2021-03-15 LAB
25(OH)D3 SERPL-MCNC: 61.5 NG/ML (ref 30–80)
25(OH)D3 SERPL-MCNC: 61.5 NG/ML (ref 30–80)

## 2021-04-25 ENCOUNTER — HEALTH MAINTENANCE LETTER (OUTPATIENT)
Age: 36
End: 2021-04-25

## 2021-05-28 NOTE — PROGRESS NOTES
Office Visit - Physical   Paula Larson   33 y.o.  female    Date of visit: 5/15/2019  Physician: Simon Kendrick MD     Assessment and Plan   1. Routine general medical examination at a health care facility  This is a generally healthy 33-year-old woman with issues as discussed below.  Ongoing healthy lifestyle discussed and recommended.    2. Family history of colon cancer  This was in her paternal grandfather.  She is going to check with her father to see if he had any advanced polyps.  Otherwise no other first-degree relatives with colorectal cancer.  We discussed starting colorectal cancer screening at age 40.    3. Screening for hyperlipidemia  - Lipid Cascade; Future    4. Nonintractable migraine, unspecified migraine type  Minimal symptoms currently    5. Heterozygous factor V Leiden mutation (H)  No history of DVT.  She has been advised against using estrogen previously.  She does take precautions on flights and long car rides I suggested she could consider taking an aspirin starting a day or 2 before a flight consider compression garments and frequent movement on flights.    6. History of recurrent UTIs  Keflex as prescribed    7. Family history of primary IgA nephropathy  - Basic Metabolic Panel  - Urinalysis-UC if Indicated    8. Menorrhagia with regular cycle / Fibroids  She is to see gynecology  - HM2(CBC w/o Differential)  - Thyroid Cascade  - Ferritin    10. Hives  - predniSONE (DELTASONE) 10 mg tablet; Take 10 mg by mouth daily.  Dispense: 15 tablet; Refill: 6  - triamcinolone (KENALOG) 0.1 % cream; Apply topically 2 (two) times a day.  Dispense: 60 g; Refill: 11    11. Motion sickness, sequela  - ondansetron (ZOFRAN) 8 MG tablet; Take 1 tablet (8 mg total) by mouth every 12 (twelve) hours as needed for nausea.  Dispense: 30 tablet; Refill: 3    Return in about 1 year (around 5/15/2020) for annual physical.     Chief Complaint   Chief Complaint   Patient presents with     Annual Exam      Establish Care        Patient Profile   Social History     Social History Narrative    Lives with her , Tony.  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          Past Medical History   Patient Active Problem List   Diagnosis     Nonintractable migraine, unspecified migraine type     Heterozygous factor V Leiden mutation (H)       Past Surgical History  She has a past surgical history that includes Thoracic outlet surgery (2002).     History of Present Illness   This 33 y.o. old woman comes in to [facility] and annual physical.  Her medical history is reviewed, electronic medical records obtained reflectance note.  Overall she is feeling well.  She does have issues with heavy menses and has been found to have fibroids.  She is going to meet with gynecologist.  She will get hives with seasonal allergens and some food.  Some motion sickness when traveling.  Frequent UTIs treated with prophylactic Keflex.    Review of Systems: A comprehensive review of systems was negative except as noted.     Medications and Allergies   Current Outpatient Medications   Medication Sig Dispense Refill     cephalexin (KEFLEX) 500 MG capsule        ondansetron (ZOFRAN) 8 MG tablet Take 1 tablet (8 mg total) by mouth every 12 (twelve) hours as needed for nausea. 30 tablet 3     predniSONE (DELTASONE) 10 mg tablet Take 10 mg by mouth daily. 15 tablet 6     triamcinolone (KENALOG) 0.1 % cream Apply topically 2 (two) times a day. 60 g 11     No current facility-administered medications for this visit.      Allergies   Allergen Reactions     Macrobid [Nitrofurantoin Monohyd/M-Cryst] Itching     Sulfa (Sulfonamide Antibiotics) Rash        Family and Social History   Family History   Problem Relation Age of Onset     IgA nephropathy Mother      Heart attack Father 52     Hypertension Father      Hyperlipidemia Father      Deep vein thrombosis Father      Melanoma Sister      Deep vein thrombosis Paternal Grandmother      Colon cancer  "Paternal Grandfather 42        Social History     Tobacco Use     Smoking status: Never Smoker     Smokeless tobacco: Never Used   Substance Use Topics     Alcohol use: Yes     Alcohol/week: 3.0 oz     Types: 5 Standard drinks or equivalent per week     Drug use: Never        Physical Exam   General Appearance:   Exceedingly pleasant, no acute distress    /80 (Patient Site: Left Arm, Patient Position: Sitting, Cuff Size: Adult Regular)   Pulse 75   Ht 5' 6\" (1.676 m)   Wt 137 lb (62.1 kg)   SpO2 100%   BMI 22.11 kg/m      EYES: Eyelids, conjunctiva, and sclera were normal. Pupils were normal. Cornea, iris, and lens were normal bilaterally.  HEAD, EARS, NOSE, MOUTH, AND THROAT: Head and face were normal. Hearing was normal to voice and the ears were normal to external exam. Nose appearance was normal and there was no discharge. Oropharynx was normal.  NECK: Neck appearance was normal. There were no neck masses and the thyroid was not enlarged.  RESPIRATORY: Breathing pattern was normal and the chest moved symmetrically.  Percussion/auscultatory percussion was normal.  Lung sounds were normal and there were no abnormal sounds.  CARDIOVASCULAR: Heart rate and rhythm were normal.  S1 and S2 were normal and there were no extra sounds or murmurs. Peripheral pulses in arms and legs were normal.  Jugular venous pressure was normal.  There was no peripheral edema.  GASTROINTESTINAL: The abdomen was normal in contour.  Bowel sounds were present.  Percussion detected no organ enlargement or tenderness.  Palpation detected no tenderness, mass, or enlarged organs.   MUSCULOSKELETAL: Skeletal configuration was normal and muscle mass was normal for age. Joint appearance was overall normal.  LYMPHATIC: There were no enlarged nodes.  SKIN/HAIR/NAILS: Skin color was normal.  There were no skin lesions.  Hair and nails were normal.  NEUROLOGIC: The patient was alert and oriented to person, place, time, and circumstance. " Speech was normal. Cranial nerves were normal. Motor strength was normal for age. The patient was normally coordinated.  PSYCHIATRIC:  Mood and affect were normal and the patient had normal recent and remote memory. The patient's judgment and insight were normal.       Additional Information        Simon Kendrick MD  Internal Medicine  Contact me at None

## 2021-05-31 VITALS — WEIGHT: 138 LBS | HEIGHT: 66 IN | BODY MASS INDEX: 22.18 KG/M2

## 2021-06-01 VITALS — BODY MASS INDEX: 22.23 KG/M2 | WEIGHT: 137.7 LBS

## 2021-06-02 VITALS — WEIGHT: 140.6 LBS | BODY MASS INDEX: 22.69 KG/M2

## 2021-06-03 VITALS — HEIGHT: 66 IN | WEIGHT: 137 LBS | BODY MASS INDEX: 22.02 KG/M2

## 2021-06-05 VITALS
OXYGEN SATURATION: 99 % | DIASTOLIC BLOOD PRESSURE: 82 MMHG | WEIGHT: 132.06 LBS | SYSTOLIC BLOOD PRESSURE: 128 MMHG | HEART RATE: 72 BPM | HEIGHT: 66 IN | BODY MASS INDEX: 21.22 KG/M2

## 2021-06-13 NOTE — PROGRESS NOTES
Chief Complaint   Patient presents with     Urinary Frequency     Blood in urine        HPI:    Dysuria with increased urinary frequency started last night. No fever, chills, flank pain, abdominal pain. No hx of renal calculus.    ROS: Pertinent ROS noted in HPI.     Allergies   Allergen Reactions     Macrobid [Nitrofurantoin Monohyd/M-Cryst] Itching     Sulfa (Sulfonamide Antibiotics) Rash       There is no problem list on file for this patient.      No family history on file.    Social History     Social History     Marital status:      Spouse name: N/A     Number of children: N/A     Years of education: N/A     Occupational History     Not on file.     Social History Main Topics     Smoking status: Never Smoker     Smokeless tobacco: Never Used     Alcohol use Not on file     Drug use: Not on file     Sexual activity: Not on file     Other Topics Concern     Not on file     Social History Narrative     No narrative on file           Objective:    Vitals:    10/26/17 0820   BP: 108/64   Pulse: 60   Resp: 16   Temp: 97.9  F (36.6  C)   SpO2: 100%       Gen:NAD  Abd: normal bowel sounds, soft, no pain, no HSM/mass. No CVA tenderness.         Recent Results (from the past 24 hour(s))   Urinalysis-UC if Indicated   Result Value Ref Range    Color, UA Yellow Colorless, Yellow, Straw, Light Yellow    Clarity, UA Clear Clear    Glucose, UA Negative Negative    Bilirubin, UA Small (!) Negative    Ketones, UA 15 mg/dL (!) Negative    Specific Gravity, UA 1.025 1.005 - 1.030    Blood, UA Large (!) Negative    pH, UA 5.5 5.0 - 8.0    Protein,  mg/dL (!) Negative mg/dL    Urobilinogen, UA 0.2 E.U./dL 0.2 E.U./dL, 1.0 E.U./dL    Nitrite, UA Negative Negative    Leukocytes, UA Small (!) Negative    Bacteria, UA None Seen None Seen hpf    RBC, UA 10-25 (!) None Seen, 0-2 hpf    WBC, UA 0-5 None Seen, 0-5 hpf    Squam Epithel, UA 0-5 None Seen, 0-5 lpf         UTI (urinary tract infection)  -     cephalexin (KEFLEX)  500 MG capsule; Take 1 capsule (500 mg total) by mouth 2 (two) times a day for 7 days.    Blood in urine  -     Urinalysis-UC if Indicated  -     Culture, Urine

## 2021-06-13 NOTE — PATIENT INSTRUCTIONS - HE
Dear Paula Larson,    Based on your exposure to COVID-19 (coronavirus), we would like to test you for this virus. I have placed an order for this test and please call [phone] to schedule testing. [employer] employees please call [phone].  [employer] employees call [phone]. The optimal time to test after exposure is 5-7 days from the exposure.    If you know you have had close contact with someone who tested positive, you should be quarantined for 14 days after this exposure. You should stay in quarantine for the14 days even if the covid test is negative.     Quarantine means:  Stay home and away from others. Don't go to school or anywhere else. Generally quarantine means staying home from work but there are some exceptions to this. Please contact your workplace.  No hugging, kissing or shaking hands.  Don't let anyone visit.  Cover your mouth and nose with a mask, tissue or washcloth to avoid spreading germs.  Wash your hands and face often. Use soap and water.    What are the symptoms of COVID-19?  The most common symptoms are cough, fever and trouble breathing. Less common symptoms include headache, body aches, fatigue (feeling very tired), chills, sore throat, stuffy or runny nose, diarrhea (loose poop), loss of taste or smell, belly pain, and nausea or vomiting (feeling sick to your stomach or throwing up).  After 14 days, if you have still don't have symptoms, you likely don't have this virus.  If you develop symptoms, follow these guidelines.  If you're normally healthy: Please start another eVisit.  If you have a serious health problem (like cancer, heart failure, an organ transplant or kidney disease): Call your specialty clinic. Let them know that you might have COVID-19.    When it's time for your COVID test:  Stay at least 6 feet away from others. (If someone will drive you to your test, stay in the backseat, as far away from the  as you can.)  Cover your mouth and  nose with a mask, tissue or washcloth.  Go straight to the testing site. Don't make any stops on the way there or back.    Please note  Patients in these groups are at risk for severe illness due to COVID-19:    People 65 years and older    People who live in a nursing home or long-term care facility    People with chronic disease (lung, heart, cancer, diabetes, kidney, liver, immunologic)    People who have a weakened immune system, including those who:  o Are in cancer treatment  o Take medicine that weakens the immune system, such as corticosteroids  o Had a bone marrow or organ transplant  o Have an immune deficiency  o Have poorly controlled HIV or AIDS  o Are obese (body mass index of 40 or higher)  o Smoke regularly    Where can I get more information?  [facility] - About COVID-19: [email or web address]  CDC - What to Do If You're Sick: www.cdc.gov/coronavirus/2019-ncov/about/steps-when-sick.html  CDC - Ending Home Isolation: www.cdc.gov/coronavirus/2019-ncov/hcp/disposition-in-home-patients.html  [facility] - Caring for Someone: www.cdc.gov/coronavirus/2019-ncov/if-you-are-sick/care-for-someone.html  [facility] - Interim Guidance for Hospital Discharge to Home: www.health.[facility].[place]/diseases/coronavirus/hcp/hospdischarge.pdf  [facility] clinical trials (COVID-19 research studies): clinicalaffairs.[facility]-clinical-trials  Below are the COVID-19 hotlines at the [facility] ([facility]). Interpreters are available.  For health questions: Call 854-838-9412 or 1-568.132.8947 (7 a.m. to 7 p.m.)  For questions about schools and childcare: Call 857-414-9049 or 1-596.770.5478 (7 a.m. to 7 p.m.)      November 12, 2020    RE:  Paula Larson                                                                                                                                                       [address]  [place] [address]        To whom it may concern:    I evaluated Paula  Maria M Larson on 11/12/20. Paula Larson should be excused from work/school until 11/21/2020.    Sincerely,  Rosanne Rivas MD

## 2021-06-15 NOTE — PROGRESS NOTES
Office Visit - Physical   Paula Larson   35 y.o.  female    Date of visit: 3/12/2021  Physician: Simon Kendrick MD     Assessment and Plan   1. Routine general medical examination at a health care facility  This is a healthy 35-year-old woman.  Immunizations up-to-date.  Cancer screening up-to-date.    2. Recurrent UTI  - cephalexin (KEFLEX) 250 MG capsule; Take 1 capsule (250 mg total) by mouth daily as needed (UTI prophylaxis).  Dispense: 60 capsule; Refill: 11  - Urinalysis-UC if Indicated    3. Hives  - predniSONE (DELTASONE) 10 mg tablet; Take 10 mg by mouth daily.  Dispense: 15 tablet; Refill: 6  - HM2(CBC w/o Differential)  - Thyroid [place]  - Comprehensive Metabolic Panel    4. Heterozygous factor V Leiden mutation (H)  No history of blood clot, family history of blood clot    5. Screening for hyperlipidemia  - Lipid [place]    6. Screening for diabetes mellitus  - Glycosylated Hemoglobin A1c    7. Encounter for vitamin deficiency screening  - Vitamin D, Total (25-Hydroxy)    Return in about 1 year (around 3/12/2022) for annual physical.     Chief Complaint   Chief Complaint   Patient presents with     Annual Exam     fasting        Patient Profile   Social History     Social History Narrative    Lives with her , Tony.  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          Past Medical History   Patient Active Problem List   Diagnosis     Nonintractable migraine, unspecified migraine type     Heterozygous factor V Leiden mutation (H)       Past Surgical History  She has a past surgical history that includes Thoracic outlet surgery (2002).     History of Present Illness   This 35 y.o. old woman comes in for annual physical.  She is feeling quite well.  She has been exercising regularly.  Healthy diet.  She is curious about her lipid panel.  Otherwise no complaints.  Will be traveling this fall for scuba diving.    Review of Systems: A comprehensive review of systems was negative except as  "noted.     Medications and Allergies   Current Outpatient Medications   Medication Sig Dispense Refill     cephalexin (KEFLEX) 250 MG capsule Take 1 capsule (250 mg total) by mouth daily as needed (UTI prophylaxis). 60 capsule 11     ondansetron (ZOFRAN) 8 MG tablet Take 1 tablet (8 mg total) by mouth every 12 (twelve) hours as needed for nausea. 30 tablet 3     predniSONE (DELTASONE) 10 mg tablet Take 10 mg by mouth daily. 15 tablet 6     triamcinolone (KENALOG) 0.1 % cream Apply topically 2 (two) times a day. 60 g 11     No current facility-administered medications for this visit.      Allergies   Allergen Reactions     Macrobid [Nitrofurantoin Monohyd/M-Cryst] Itching     Sulfa (Sulfonamide Antibiotics) Rash        Family and Social History   Family History   Problem Relation Age of Onset     IgA nephropathy Mother      Heart attack Father 52     Hypertension Father      Hyperlipidemia Father      Deep vein thrombosis Father      Melanoma Sister      Deep vein thrombosis Paternal Grandmother      Colon cancer Paternal Grandfather 42        Social History     Tobacco Use     Smoking status: Never Smoker     Smokeless tobacco: Never Used   Substance Use Topics     Alcohol use: Yes     Alcohol/week: 5.0 standard drinks     Types: 5 Standard drinks or equivalent per week     Drug use: Never        Physical Exam   General Appearance:   Exceedingly pleasant, no acute distress    /82 (Patient Site: Left Arm, Patient Position: Sitting, Cuff Size: Adult Regular)   Pulse 72   Ht 5' 6\" (1.676 m)   Wt 132 lb 1 oz (59.9 kg)   SpO2 99%   BMI 21.32 kg/m      EYES: Eyelids, conjunctiva, and sclera were normal. Pupils were normal. Cornea, iris, and lens were normal bilaterally.  HEAD, EARS, NOSE, MOUTH, AND THROAT: Head and face were normal. Hearing was normal to voice and the ears were normal to external exam. Nose appearance was normal and there was no discharge. Oropharynx was normal.  NECK: Neck appearance was " normal. There were no neck masses and the thyroid was not enlarged.  RESPIRATORY: Breathing pattern was normal and the chest moved symmetrically.  Percussion/auscultatory percussion was normal.  Lung sounds were normal and there were no abnormal sounds.  CARDIOVASCULAR: Heart rate and rhythm were normal.  S1 and S2 were normal and there were no extra sounds or murmurs. Peripheral pulses in arms and legs were normal.  Jugular venous pressure was normal.  There was no peripheral edema.  GASTROINTESTINAL: The abdomen was normal in contour.  Bowel sounds were present.  Percussion detected no organ enlargement or tenderness.  Palpation detected no tenderness, mass, or enlarged organs.   MUSCULOSKELETAL: Skeletal configuration was normal and muscle mass was normal for age. Joint appearance was overall normal.  LYMPHATIC: There were no enlarged nodes.  SKIN/HAIR/NAILS: Skin color was normal.  There were no skin lesions.  Hair and nails were normal.  NEUROLOGIC: The patient was alert and oriented to person, place, time, and circumstance. Speech was normal. Cranial nerves were normal. Motor strength was normal for age. The patient was normally coordinated.  PSYCHIATRIC:  Mood and affect were normal and the patient had normal recent and remote memory. The patient's judgment and insight were normal.       Additional Information        Simon Kendrick MD  Internal Medicine  Contact me at [phone]

## 2021-06-18 NOTE — PROGRESS NOTES
ASSESSMENT/PLAN:   1. UTI (urinary tract infection)  Urine,Microscopic    Culture, Urine    Ambulatory referral to Urology    cephalexin (KEFLEX) 500 MG capsule    phenazopyridine (PYRIDIUM) 100 MG tablet    CANCELED: Urinalysis-UC if Indicated       The patient's symptoms and laboratory studies suggest uncomplicated UTI.   Clinically, this is not pyelonephritis, urosepsis given no fevers. Nothing on history to suggest kidney stone, ovarian/testicular torsion, PID, appendicitis, diverticulitis, kidney injury, glomerular bleeding, or any other urgent or emergent condition.     The patient is vitally stable and appropriate for outpatient antibiotic therapy. I have prescribed Keflex for the patient as previous UC shows klebsiella susceptible to cephalosporins. I educated the patient to drink plenty of fluids and to take a probiotic while taking antibiotics.    I educated the patient on warning signs for immediate follow up including fevers/chills, nausea, vomiting, hematuria, abdominal pain, altered mental status. I educated the patient to otherwise follow up with primary care doctor as needed or if symptoms do not improve. Please view below patient instructions for patient education and return precautions as were discussed during visit.  Patient understood and agreed. Patient was stable for discharge.      Patient Instructions:  Patient Instructions   Your urine test shows evidence of a urinary tract infection.    We will treat you with an antibiotic, Keflex.  I sent this to your pharmacy. Please take as directed for 7 days. Please take a probiotic or eat a daily Greek yogurt while you are on the antibiotic.    If developing high fevers, vomiting, abdominal pain, or any other new, concerning symptoms, come back immediately. If no improvement in symptoms by the end of your antibiotic treatment, follow up with your primary care doctor.    Otherwise, simply follow up as needed.        Urinary Tract Infections in  Women  Urinary tract infections (UTIs) are most often caused by bacteria (germs). These bacteria enter the urinary tract. The bacteria may come from outside the body. Or they may travel from the skin outside the rectum or vagina into the urethra. Female anatomy makes it easier for bacteria from the bowel to enter a woman s urinary tract, which is the most common source of UTI. This means women develop UTIs more often than men. Pain in or around the urinary tract is a common UTI symptom. But the only way to know for sure if you have a UTI for the health care provider to test your urine. The two tests that may be done are the urinalysis and urine culture.  Types of UTIs    Cystitis: A bladder infection (cystitis) is the most common UTI in women. You may have urgent or frequent urination. You may also have pain, burning when you urinate, and bloody urine.    Urethritis: This is an inflamed urethra, which is the tube that carries urine from the bladder to outside the body. You may have lower stomach or back pain. You may also have urgent or frequent urination.    Pyelonephritis: This is a kidney infection. If not treated, it can be serious and damage your kidneys. In severe cases, you may be hospitalized. You may have a fever and lower back pain.  Medications to treat a UTI  Most UTIs are treated with antibiotics. These kill the bacteria. The length of time you need to take them depends on the type of infection. It may be as short as 3 days. If you have repeated UTIs, a low-dose antibiotic may be needed for several months. Take antibiotics exactly as directed. Don t stop taking them until all of the medication is gone. If you stop taking the antibiotic too soon, the infection may not go away, and you may develop a resistance to the antibiotic. This can make it much harder to treat.  Lifestyle changes to treat and prevent UTIs  The lifestyle changes below will help get rid of your UTI. They may also help prevent future  UTIs.    Drink plenty of fluids. This includes water, juice, or other caffeine-free drinks. Fluids help flush bacteria out of your body.    Empty your bladder. Always empty your bladder when you feel the urge to urinate. And always urinate before going to sleep. Urine that stays in your bladder can lead to infection. Try to urinate before and after sex as well.    Practice good personal hygiene. Wipe yourself from front to back after using the toilet. This helps keep bacteria from getting into the urethra.    Use condoms during sex. These help prevent UTIs caused by sexually transmitted bacteria. Also, avoid using spermicides during sex. These can increase the risk of UTIs. Choose other forms of birth control instead. For women who tend to get UTIs after sex, a low-dose of a preventive antibiotic may be used. Be sure to discuss this option with your health care provider.    Follow up with your health care provider as directed. He or she may test to make sure the infection has cleared. If necessary, additional treatment may be started.    4174-7285 [employer]. [address] [place] [address]. All rights reserved. This information is not intended as a substitute for professional medical care. Always follow your healthcare professional's instructions.        SUBJECTIVE:   Paula Larson is a 32 y.o. female who presents today for evaluation of pain with urination, hematuria, and increased urinary frequency with urgency since 0200.  No abdominal pain or back pain, no fevers, no flank pain, no vomiting. No vaginal discharge or itching.  Has had frequent UTIs, was previously on prophylactic macrobid, but then developed an allergy to it. Last antibiotic use was in October of last year.  Has taken pyridium this morning.  Today is requesting urology referral as she has never seen them in regards to frequent UTI.    Past Medical History:  There is no problem list on file for this  patient.      Surgical History:    Reviewed; Non-contributory    Family History:  No family history on file.    Reviewed; Non-contributory      Social History:    History   Smoking Status     Never Smoker   Smokeless Tobacco     Never Used     Current Medications:  No current outpatient prescriptions on file prior to visit.     No current facility-administered medications on file prior to visit.        Allergies:   Allergies   Allergen Reactions     Macrobid [Nitrofurantoin Monohyd/M-Cryst] Itching     Sulfa (Sulfonamide Antibiotics) Rash       I personally reviewed patient's past medical, surgical, social, family history and allergies.    ROS:  Review of Systems  An 8point review of systems was conducted and otherwise negative unless noted in HPI.    OBJECTIVE:   /77 (Patient Site: Left Arm, Patient Position: Sitting, Cuff Size: Adult Regular)  Pulse 70  Temp 97.6  F (36.4  C) (Oral)   Resp 16  Wt 137 lb 11.2 oz (62.5 kg)  LMP 06/08/2018  SpO2 100%  BMI 22.23 kg/m2      General Appearance:  Alert,  well-appearing female in NAD. Afebrile.    Integument: Warm, dry  HEENT: Moist mucus membranes.  Respiratory: No distress. Lungs clear to ausculation bilaterally. No crackles, wheezes, rhonchi or stridor.  Cardiovascular: Regular rate and rhythm, no murmur, rub or gallop. No obvious chest wall deformities. Peripheral pulses 2+ bilaterally. No peripheral edema.  GI: Soft, nontender, normal bowel sounds. No masses, organomegaly, rigidity, or guarding. No CVAT.  Neurologic: Alert and orientated appropriately. No focal deficits. Follows commands.      Radiology:  I personally ordered and viewed this study. I agree with below radiology findings.    none    Laboratory Studies:  I personally ordered and interpreted these studies.    Results for orders placed or performed in visit on 06/18/18   Urine,Microscopic   Result Value Ref [facility]    Bacteria, UA Few (!) None Seen hpf    RBC, UA 3-5 (!) None Seen, 0-2 hpf     WBC, UA 5-10 (!) None Seen, 0-5 hpf    Squam Epithel, UA 5-10 (!) None Seen, 0-5 lpf       Marilu Gaytan, CNP

## 2021-06-26 NOTE — PROGRESS NOTES
Progress Notes by Amado Oswald DO at 11/22/2018  8:40 AM     Author: Amado Oswald DO Service: -- Author Type: Physician    Filed: 11/22/2018  9:43 AM Encounter Date: 11/22/2018 Status: Signed    : Amado Oswald DO (Physician)       Chief Complaint   Patient presents with   ? Dysuria     2 days     History of Present Illness: Rooming staff notes reviewed. Patient has had dysuria since yesterday, and urinary urgency this morning. No recent fever or new back pain.     Review of systems: See history of present illness, otherwise negative.     Current Outpatient Medications   Medication Sig Dispense Refill   ? cephalexin (KEFLEX) 500 MG capsule Take 1 capsule (500 mg total) by mouth 2 (two) times a day for 7 days. 14 capsule 0   ? phenazopyridine (PYRIDIUM) 100 MG tablet Take 1 tablet (100 mg total) by mouth 3 (three) times a day as needed for pain. 20 tablet 0     No current facility-administered medications for this visit.      No past medical history on file.   No past surgical history on file.   Social History     Tobacco Use   ? Smoking status: Never Smoker   ? Smokeless tobacco: Never Used   Substance Use Topics   ? Alcohol use: Not on file   ? Drug use: Not on file        No family history on file.    Vitals:    11/22/18 0856   BP: 118/78   Patient Site: Left Arm   Patient Position: Sitting   Cuff Size: Adult Regular   Pulse: 67   Temp: 97.8  F (36.6  C)   TempSrc: Oral   SpO2: 99%   Weight: 140 lb 9.6 oz (63.8 kg)       EXAM:   General: Vital signs reviewed.  Patient is in no acute appearing distress.  Breathing appears nonlabored.  Patient is alert and oriented ×3.  Back exam: No areas of tenderness.    Recent Results (from the past 24 hour(s))   Urinalysis-UC if Indicated   Result Value Ref [facility]    Color, UA Yellow Colorless, Yellow, Straw, Light Yellow    Clarity, UA Clear Clear    Glucose, UA Negative Negative    Bilirubin, UA Negative Negative    Ketones, UA Negative Negative    Specific  Gravity, UA 1.020 1.005 - 1.030    Blood, UA Negative Negative    pH, UA 7.0 5.0 - 8.0    Protein, UA Negative Negative mg/dL    Urobilinogen, UA 0.2 E.U./dL 0.2 E.U./dL, 1.0 E.U./dL    Nitrite, UA Negative Negative    Leukocytes, UA Negative Negative   Results from exam reviewed with patient.  The urinalysis was normal.    Assessment/Plan   1. Dysuria  Urinalysis-UC if Indicated    Culture, Urine       Patient Instructions   Fill the antibiotic prescription if the culture comes back suggesting you have a urine infection. It should be back within 2 days. We will call you if you need treatment.     Patient Education     Dysuria with Uncertain Cause (Adult)    The urethra is the tube that allows urine to pass out of the body. In a woman, the urethra is the opening above the vagina. In men, the urethra is the opening on the tip of the penis. Dysuria is the feeling of pain or burning in the urethra when passing urine.  Dysuria can be caused by anything that irritates or inflames the urethra. An infection or chemical irritation can cause this reaction. A bladder infection is the most common cause of dysuria in adults. A urine test can diagnose this. A bladder infection needs antibiotic treatment.  Soaps, lotions, colognes and feminine hygiene products can cause dysuria. So can birth control jellies, creams, and foams. It will go away 1 to 3 days after using these irritants.  Sexually transmitted diseases (STDs) such as chlamydia or gonorrhea can cause dysuria. Your healthcare provider may take a culture sample. Your provider may start you on antibiotic medicine before the culture test returns.  In women who have gone through menopause, dysuria can be from dryness in the lining of the urethra. This can be treated with hormones. Dysuria becomes long-term (chronic) when it lasts for weeks or months. You may need to see a specialist (urologist) to diagnose and treat chronic dysuria.  Home care  These home care tips may  help:    Don't use any chemicals or products that you think may be causing your symptoms.    If you were given a prescription medicine, take as directed. Be sure to take it until it is all used up.    If a culture was taken, don't have sex until you have been told that it is negative. This means you don't have an infection. Then follow your healthcare provider's advice to treat your condition.  If a culture was done and it is positive:    Both you and your sexual partner may need to be treated. This is true even if your partner has no symptoms.    Contact your healthcare provider or go to an urgent care clinic or the public health department to be looked at and treated.    Don't have sex until both you and your partner(s) have finished all antibiotics and your healthcare provider says you are no longer contagious.    Learn about and use safe sex practices. The safest sex is with a partner who has tested negative and only has sex with you. Condoms can prevent STDs from spreading, but they aren't a guarantee.  Follow-up care  Follow up with your healthcare provider, or as advised. If a culture was taken, you may call as directed for the results. If you have an STD, follow up with your provider or the public health department for a complete STD screening, including HIV testing. For more information, contact CDC-INFO at 117-671-1334.  When to seek medical advice  Call your healthcare provider right away if any of these occur:    You aren't better after 3 days of treatment    Fever of 100.4 F (38 C) or higher, or as directed by your healthcare provider    Back or belly pain that gets worse    You can't urinate because of pain    New discharge from the urethra, vagina, or penis    Painful sores on the penis    Rash or joint pain    Painful lumps (lymph nodes) in the groin    Testicle pain or swelling of the scrotum  Date Last Reviewed: 11/1/2016 2000-2017 [employer]. [address] [place] [address], [place]  [address]. All rights reserved. This information is not intended as a substitute for professional medical care. Always follow your healthcare professional's instructions.              Amado Oswald, DO

## 2022-05-21 ENCOUNTER — HEALTH MAINTENANCE LETTER (OUTPATIENT)
Age: 37
End: 2022-05-21

## 2022-09-17 ENCOUNTER — HEALTH MAINTENANCE LETTER (OUTPATIENT)
Age: 37
End: 2022-09-17

## 2023-02-23 ENCOUNTER — OFFICE VISIT (OUTPATIENT)
Dept: INTERNAL MEDICINE | Facility: CLINIC | Age: 38
End: 2023-02-23
Payer: COMMERCIAL

## 2023-02-23 VITALS
SYSTOLIC BLOOD PRESSURE: 122 MMHG | TEMPERATURE: 98.5 F | HEART RATE: 66 BPM | WEIGHT: 140 LBS | RESPIRATION RATE: 18 BRPM | BODY MASS INDEX: 22.5 KG/M2 | OXYGEN SATURATION: 100 % | DIASTOLIC BLOOD PRESSURE: 78 MMHG | HEIGHT: 66 IN

## 2023-02-23 DIAGNOSIS — L50.9 HIVES: ICD-10-CM

## 2023-02-23 DIAGNOSIS — Z00.00 ANNUAL PHYSICAL EXAM: Primary | ICD-10-CM

## 2023-02-23 DIAGNOSIS — J30.89 SEASONAL ALLERGIC RHINITIS DUE TO OTHER ALLERGIC TRIGGER: ICD-10-CM

## 2023-02-23 DIAGNOSIS — N39.0 FREQUENT UTI: ICD-10-CM

## 2023-02-23 DIAGNOSIS — Z13.220 SCREENING FOR HYPERLIPIDEMIA: ICD-10-CM

## 2023-02-23 DIAGNOSIS — N94.6 DYSMENORRHEA: ICD-10-CM

## 2023-02-23 DIAGNOSIS — Z13.1 SCREENING FOR DIABETES MELLITUS: ICD-10-CM

## 2023-02-23 LAB
ALBUMIN SERPL BCG-MCNC: 4.9 G/DL (ref 3.5–5.2)
ALP SERPL-CCNC: 56 U/L (ref 35–104)
ALT SERPL W P-5'-P-CCNC: 10 U/L (ref 10–35)
ANION GAP SERPL CALCULATED.3IONS-SCNC: 12 MMOL/L (ref 7–15)
AST SERPL W P-5'-P-CCNC: 21 U/L (ref 10–35)
BILIRUB SERPL-MCNC: 0.8 MG/DL
BUN SERPL-MCNC: 12.2 MG/DL (ref 6–20)
CALCIUM SERPL-MCNC: 10 MG/DL (ref 8.6–10)
CHLORIDE SERPL-SCNC: 105 MMOL/L (ref 98–107)
CHOLEST SERPL-MCNC: 210 MG/DL
CREAT SERPL-MCNC: 0.77 MG/DL (ref 0.51–0.95)
DEPRECATED HCO3 PLAS-SCNC: 24 MMOL/L (ref 22–29)
ERYTHROCYTE [DISTWIDTH] IN BLOOD BY AUTOMATED COUNT: 12.2 % (ref 10–15)
GFR SERPL CREATININE-BSD FRML MDRD: >90 ML/MIN/1.73M2
GLUCOSE SERPL-MCNC: 97 MG/DL (ref 70–99)
HBA1C MFR BLD: 5.3 % (ref 0–5.6)
HCT VFR BLD AUTO: 38.8 % (ref 35–47)
HDLC SERPL-MCNC: 88 MG/DL
HGB BLD-MCNC: 12.9 G/DL (ref 11.7–15.7)
LDLC SERPL CALC-MCNC: 109 MG/DL
MCH RBC QN AUTO: 31.5 PG (ref 26.5–33)
MCHC RBC AUTO-ENTMCNC: 33.2 G/DL (ref 31.5–36.5)
MCV RBC AUTO: 95 FL (ref 78–100)
NONHDLC SERPL-MCNC: 122 MG/DL
PLATELET # BLD AUTO: 278 10E3/UL (ref 150–450)
POTASSIUM SERPL-SCNC: 4.7 MMOL/L (ref 3.4–5.3)
PROT SERPL-MCNC: 7.3 G/DL (ref 6.4–8.3)
RBC # BLD AUTO: 4.1 10E6/UL (ref 3.8–5.2)
SODIUM SERPL-SCNC: 141 MMOL/L (ref 136–145)
TRIGL SERPL-MCNC: 67 MG/DL
TSH SERPL DL<=0.005 MIU/L-ACNC: 2.83 UIU/ML (ref 0.3–4.2)
WBC # BLD AUTO: 7.2 10E3/UL (ref 4–11)

## 2023-02-23 PROCEDURE — 84443 ASSAY THYROID STIM HORMONE: CPT | Performed by: INTERNAL MEDICINE

## 2023-02-23 PROCEDURE — 36415 COLL VENOUS BLD VENIPUNCTURE: CPT | Performed by: INTERNAL MEDICINE

## 2023-02-23 PROCEDURE — 80053 COMPREHEN METABOLIC PANEL: CPT | Performed by: INTERNAL MEDICINE

## 2023-02-23 PROCEDURE — 80061 LIPID PANEL: CPT | Performed by: INTERNAL MEDICINE

## 2023-02-23 PROCEDURE — 99395 PREV VISIT EST AGE 18-39: CPT | Performed by: INTERNAL MEDICINE

## 2023-02-23 PROCEDURE — 85027 COMPLETE CBC AUTOMATED: CPT | Performed by: INTERNAL MEDICINE

## 2023-02-23 PROCEDURE — 83036 HEMOGLOBIN GLYCOSYLATED A1C: CPT | Performed by: INTERNAL MEDICINE

## 2023-02-23 RX ORDER — PREDNISONE 10 MG/1
10 TABLET ORAL DAILY
Qty: 30 TABLET | Refills: 1 | Status: SHIPPED | OUTPATIENT
Start: 2023-02-23

## 2023-02-23 RX ORDER — PREDNISONE 10 MG/1
10 TABLET ORAL DAILY
COMMUNITY
Start: 2021-03-12 | End: 2023-02-23

## 2023-02-23 ASSESSMENT — ENCOUNTER SYMPTOMS
HEMATOCHEZIA: 0
COUGH: 0
JOINT SWELLING: 0
BREAST MASS: 0
HEMATURIA: 0
WEAKNESS: 0
CONSTIPATION: 0
NERVOUS/ANXIOUS: 0
DYSURIA: 0
CHILLS: 0
ABDOMINAL PAIN: 0
FREQUENCY: 0
DIARRHEA: 0
NAUSEA: 0
DIZZINESS: 0
SORE THROAT: 0
HEARTBURN: 0
HEADACHES: 0
ARTHRALGIAS: 0
EYE PAIN: 0
SHORTNESS OF BREATH: 0
PARESTHESIAS: 0
FEVER: 0
MYALGIAS: 0
PALPITATIONS: 0

## 2023-02-23 NOTE — PROGRESS NOTES
Office Visit - Physical   Paula Larson   37 year old  female    Date of visit: 2023  Physician: Simon Kendrick MD     Assessment and Plan   1. Annual physical exam  This is a 37-year-old woman comes in for annual physical and has no complaints.  Ongoing healthy lifestyle discussed and recommended    2. Screening for diabetes mellitus  - Hemoglobin A1c; Future    3. Screening for hyperlipidemia  - Lipid panel reflex to direct LDL Fasting; Future    4. Frequent UTI  Not currently having any symptoms and a refill was provided as her prescription has   - cephALEXin (KEFLEX) 250 MG capsule; Take 1 cap by mouth once daily as needed for UTI prevention.  Dispense: 30 capsule; Refill: 1    5. Dysmenorrhea  This is stable and we will update labs  - TSH with free T4 reflex; Future  - CBC with platelets; Future  - Comprehensive metabolic panel; Future  - TSH with free T4 reflex  - CBC with platelets  - Comprehensive metabolic panel    6. Seasonal allergic rhinitis due to other allergic trigger  7. Hives  Occasionally will develop hives if exposed to an allergen for instance when traveling and therefore has some prednisone available as needed.  Currently not having any issues  - predniSONE (DELTASONE) 10 MG tablet; Take 1 tablet (10 mg) by mouth daily As needed for allergic  Dispense: 30 tablet; Refill: 1    Return in about 1 year (around 2024) for Routine preventive.     Chief Complaint   Chief Complaint   Patient presents with     Physical     Fasting for labs - need forms completed         Patient Profile   Social History     Social History Narrative    Lives with her , Tony.  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          Past Medical History   Patient Active Problem List   Diagnosis     Frequent UTI     Dysmenorrhea       Past Surgical History  She has a past surgical history that includes Thoracic Outlet Surgery ().     History of Present Illness   This 37 year old woman comes in for  "annual physical.  She has no concerns and no complaints.  She would like a refill of medications that she takes on an intermittent basis as needed    Review of Systems: A comprehensive review of systems was negative except as noted.     Medications and Allergies   Current Outpatient Medications   Medication Sig Dispense Refill     Calcium-Magnesium-Vitamin D (CALCIUM 500 PO) Take 1 tablet by mouth daily       cephALEXin (KEFLEX) 250 MG capsule Take 1 cap by mouth once daily as needed for UTI prevention. 30 capsule 1     fexofenadine (ALLEGRA) 180 MG tablet Take 180 mg by mouth daily       predniSONE (DELTASONE) 10 MG tablet Take 1 tablet (10 mg) by mouth daily As needed for allergic 30 tablet 1     Vitamin D3 (CHOLECALCIFEROL) 125 MCG (5000 UT) tablet Take by mouth daily       Allergies   Allergen Reactions     Macrobid [Nitrofurantoin]      Pertussis Vaccine Other (See Comments)     Lymphadenopathy, pain     Sulfa Drugs         Family and Social History   Family History   Problem Relation Age of Onset     IgA nephropathy Mother      Coronary Artery Disease Early Onset Father      Hypertension Father      Coronary Artery Disease Father 52     Hyperlipidemia Father      Deep Vein Thrombosis Father      Melanoma Sister      Deep Vein Thrombosis Paternal Grandmother      Colon Cancer Paternal Grandfather 42        Social History     Tobacco Use     Smoking status: Never     Smokeless tobacco: Never   Substance Use Topics     Alcohol use: Yes     Alcohol/week: 5.0 standard drinks     Drug use: Never        Physical Exam   General Appearance:   No acute distress    /78 (BP Location: Left arm, Patient Position: Sitting, Cuff Size: Adult Regular)   Pulse 66   Temp 98.5  F (36.9  C)   Resp 18   Ht 1.676 m (5' 6\")   Wt 63.5 kg (140 lb)   SpO2 100%   BMI 22.60 kg/m      EYES: Eyelids, conjunctiva, and sclera were normal. Pupils were normal. Cornea, iris, and lens were normal bilaterally.  HEAD, EARS, NOSE, MOUTH, " AND THROAT: Head and face were normal. Hearing was normal to voice and the ears were normal to external exam. NECK: Neck appearance was normal. There were no neck masses and the thyroid was not enlarged.  RESPIRATORY: Breathing pattern was normal and the chest moved symmetrically.  Percussion/auscultatory percussion was normal.  Lung sounds were normal and there were no abnormal sounds.  CARDIOVASCULAR: Heart rate and rhythm were normal.  S1 and S2 were normal and there were no extra sounds or murmurs. Peripheral pulses in arms and legs were normal.  Jugular venous pressure was normal.  There was no peripheral edema.  GASTROINTESTINAL: The abdomen was normal in contour.  MUSCULOSKELETAL: Skeletal configuration was normal and muscle mass was normal for age. Joint appearance was overall normal.  LYMPHATIC: There were no enlarged nodes.  SKIN/HAIR/NAILS: Skin color was normal.  There were no skin lesions.  Hair and nails were normal.  NEUROLOGIC: The patient was alert and oriented to person, place, time, and circumstance. Speech was normal. Cranial nerves were normal. Motor strength was normal for age. The patient was normally coordinated.  PSYCHIATRIC:  Mood and affect were normal and the patient had normal recent and remote memory. The patient's judgment and insight were normal.       Additional Information        Simon Kendrick MD  Internal Medicine  Contact me at [phone]  Answers for HPI/ROS submitted by the patient on 2/23/2023  Frequency of exercise:: 4-5 days/week  Getting at least 3 servings of Calcium per day:: Yes  Diet:: Regular (no restrictions)  Taking medications regularly:: Yes  Medication side effects:: None  Bi-annual eye exam:: Yes  Dental care twice a year:: Yes  Sleep apnea or symptoms of sleep apnea:: None  abdominal pain: No  Blood in stool: No  Blood in urine: No  chest pain: No  chills: No  congestion: No  constipation: No  cough: No  diarrhea: No  dizziness: No  ear pain: No  eye pain:  No  nervous/anxious: No  fever: No  frequency: No  genital sores: No  headaches: No  hearing loss: No  heartburn: No  arthralgias: No  joint swelling: No  peripheral edema: No  mood changes: No  myalgias: No  nausea: No  dysuria: No  palpitations: No  Skin sensation changes: No  sore throat: No  urgency: No  rash: No  shortness of breath: No  visual disturbance: No  weakness: No  pelvic pain: No  vaginal bleeding: No  vaginal discharge: No  tenderness: No  breast mass: No  breast discharge: No  Additional concerns today:: No  Duration of exercise:: 30-45 minutes

## 2023-03-24 ENCOUNTER — OFFICE VISIT (OUTPATIENT)
Dept: INTERNAL MEDICINE | Facility: CLINIC | Age: 38
End: 2023-03-24
Payer: COMMERCIAL

## 2023-03-24 ENCOUNTER — HOSPITAL ENCOUNTER (OUTPATIENT)
Dept: CT IMAGING | Facility: HOSPITAL | Age: 38
Discharge: HOME | End: 2023-03-24
Attending: INTERNAL MEDICINE
Payer: COMMERCIAL

## 2023-03-24 ENCOUNTER — APPOINTMENT (OUTPATIENT)
Dept: LAB | Facility: HOSPITAL | Age: 38
End: 2023-03-24
Attending: INTERNAL MEDICINE
Payer: COMMERCIAL

## 2023-03-24 ENCOUNTER — ANCILLARY PROCEDURE (OUTPATIENT)
Dept: GENERAL RADIOLOGY | Facility: CLINIC | Age: 38
End: 2023-03-24
Attending: INTERNAL MEDICINE
Payer: COMMERCIAL

## 2023-03-24 VITALS
TEMPERATURE: 98.2 F | SYSTOLIC BLOOD PRESSURE: 122 MMHG | HEIGHT: 66 IN | BODY MASS INDEX: 22.5 KG/M2 | OXYGEN SATURATION: 100 % | RESPIRATION RATE: 16 BRPM | HEART RATE: 101 BPM | DIASTOLIC BLOOD PRESSURE: 81 MMHG | WEIGHT: 140 LBS

## 2023-03-24 DIAGNOSIS — R10.0 ACUTE ABDOMEN: ICD-10-CM

## 2023-03-24 DIAGNOSIS — N92.6 ABNORMAL MENSES: ICD-10-CM

## 2023-03-24 DIAGNOSIS — R11.2 NAUSEA AND VOMITING, UNSPECIFIED VOMITING TYPE: ICD-10-CM

## 2023-03-24 DIAGNOSIS — R11.2 NAUSEA AND VOMITING, UNSPECIFIED VOMITING TYPE: Primary | ICD-10-CM

## 2023-03-24 DIAGNOSIS — R19.7 DIARRHEA, UNSPECIFIED TYPE: ICD-10-CM

## 2023-03-24 DIAGNOSIS — R50.9 FEVER, UNSPECIFIED FEVER CAUSE: ICD-10-CM

## 2023-03-24 LAB
ALBUMIN SERPL BCG-MCNC: 4.5 G/DL (ref 3.5–5.2)
ALBUMIN UR-MCNC: NEGATIVE MG/DL
ALP SERPL-CCNC: 56 U/L (ref 35–104)
ALT SERPL W P-5'-P-CCNC: 13 U/L (ref 10–35)
ANION GAP SERPL CALCULATED.3IONS-SCNC: 12 MMOL/L (ref 7–15)
APPEARANCE UR: CLEAR
AST SERPL W P-5'-P-CCNC: 21 U/L (ref 10–35)
BASOPHILS # BLD AUTO: 0 10E3/UL (ref 0–0.2)
BASOPHILS NFR BLD AUTO: 0 %
BILIRUB SERPL-MCNC: 0.4 MG/DL
BILIRUB UR QL STRIP: ABNORMAL
BUN SERPL-MCNC: 14.1 MG/DL (ref 6–20)
CALCIUM SERPL-MCNC: 9.3 MG/DL (ref 8.6–10)
CHLORIDE SERPL-SCNC: 104 MMOL/L (ref 98–107)
COLOR UR AUTO: YELLOW
CREAT SERPL-MCNC: 0.93 MG/DL (ref 0.51–0.95)
DEPRECATED HCO3 PLAS-SCNC: 23 MMOL/L (ref 22–29)
EOSINOPHIL # BLD AUTO: 0 10E3/UL (ref 0–0.7)
EOSINOPHIL NFR BLD AUTO: 0 %
ERYTHROCYTE [DISTWIDTH] IN BLOOD BY AUTOMATED COUNT: 12 % (ref 10–15)
GFR SERPL CREATININE-BSD FRML MDRD: 81 ML/MIN/1.73M2
GLUCOSE SERPL-MCNC: 98 MG/DL (ref 70–99)
GLUCOSE UR STRIP-MCNC: NEGATIVE MG/DL
HCG UR QL: NEGATIVE
HCT VFR BLD AUTO: 43.8 % (ref 35–47)
HGB BLD-MCNC: 14.6 G/DL (ref 11.7–15.7)
HGB UR QL STRIP: NEGATIVE
IMM GRANULOCYTES # BLD: 0 10E3/UL
IMM GRANULOCYTES NFR BLD: 0 %
KETONES UR STRIP-MCNC: 40 MG/DL
LACTATE SERPL-SCNC: 1 MMOL/L (ref 0.7–2)
LEUKOCYTE ESTERASE UR QL STRIP: NEGATIVE
LIPASE SERPL-CCNC: 22 U/L (ref 13–60)
LYMPHOCYTES # BLD AUTO: 0.6 10E3/UL (ref 0.8–5.3)
LYMPHOCYTES NFR BLD AUTO: 5 %
MCH RBC QN AUTO: 31.1 PG (ref 26.5–33)
MCHC RBC AUTO-ENTMCNC: 33.3 G/DL (ref 31.5–36.5)
MCV RBC AUTO: 93 FL (ref 78–100)
MONOCYTES # BLD AUTO: 0.5 10E3/UL (ref 0–1.3)
MONOCYTES NFR BLD AUTO: 5 %
NEUTROPHILS # BLD AUTO: 9.9 10E3/UL (ref 1.6–8.3)
NEUTROPHILS NFR BLD AUTO: 90 %
NITRATE UR QL: NEGATIVE
PH UR STRIP: 5.5 [PH] (ref 5–8)
PLATELET # BLD AUTO: 232 10E3/UL (ref 150–450)
POTASSIUM SERPL-SCNC: 4.6 MMOL/L (ref 3.4–5.3)
PROT SERPL-MCNC: 6.8 G/DL (ref 6.4–8.3)
RBC # BLD AUTO: 4.7 10E6/UL (ref 3.8–5.2)
SODIUM SERPL-SCNC: 139 MMOL/L (ref 136–145)
SP GR UR STRIP: >=1.03 (ref 1–1.03)
UROBILINOGEN UR STRIP-ACNC: 0.2 E.U./DL
WBC # BLD AUTO: 11 10E3/UL (ref 4–11)

## 2023-03-24 PROCEDURE — 85025 COMPLETE CBC W/AUTO DIFF WBC: CPT | Performed by: INTERNAL MEDICINE

## 2023-03-24 PROCEDURE — 83690 ASSAY OF LIPASE: CPT | Performed by: INTERNAL MEDICINE

## 2023-03-24 PROCEDURE — 36415 COLL VENOUS BLD VENIPUNCTURE: CPT | Performed by: INTERNAL MEDICINE

## 2023-03-24 PROCEDURE — 80053 COMPREHEN METABOLIC PANEL: CPT | Performed by: INTERNAL MEDICINE

## 2023-03-24 PROCEDURE — 81025 URINE PREGNANCY TEST: CPT | Performed by: INTERNAL MEDICINE

## 2023-03-24 PROCEDURE — 83605 ASSAY OF LACTIC ACID: CPT | Performed by: INTERNAL MEDICINE

## 2023-03-24 PROCEDURE — 99214 OFFICE O/P EST MOD 30 MIN: CPT | Performed by: INTERNAL MEDICINE

## 2023-03-24 PROCEDURE — 74019 RADEX ABDOMEN 2 VIEWS: CPT | Mod: TC | Performed by: RADIOLOGY

## 2023-03-24 PROCEDURE — 250N000011 HC RX IP 250 OP 636: Performed by: INTERNAL MEDICINE

## 2023-03-24 PROCEDURE — 74177 CT ABD & PELVIS W/CONTRAST: CPT

## 2023-03-24 PROCEDURE — 81003 URINALYSIS AUTO W/O SCOPE: CPT | Performed by: INTERNAL MEDICINE

## 2023-03-24 PROCEDURE — 87493 C DIFF AMPLIFIED PROBE: CPT | Mod: 59 | Performed by: INTERNAL MEDICINE

## 2023-03-24 PROCEDURE — 87506 IADNA-DNA/RNA PROBE TQ 6-11: CPT | Performed by: INTERNAL MEDICINE

## 2023-03-24 RX ORDER — IOPAMIDOL 755 MG/ML
100 INJECTION, SOLUTION INTRAVASCULAR ONCE
Status: COMPLETED | OUTPATIENT
Start: 2023-03-24 | End: 2023-03-24

## 2023-03-24 RX ADMIN — IOPAMIDOL 100 ML: 755 INJECTION, SOLUTION INTRAVENOUS at 16:52

## 2023-03-24 NOTE — PROGRESS NOTES
Office Visit - Follow Up   Paula Larson   37 year old female    Date of Visit: 3/24/2023    Chief Complaint   Patient presents with     Abnormal Bleeding Problem     Abdominal Pain     v     Vomiting     Diarrhea     Pt reports that she has has these concerns for about a week, Vomiting,diarrhea,Abdominal pain,had abnormal menstrual cycle, loss of appetite,fatigue, nausea.        Assessment and Plan   1. Nausea and vomiting, unspecified vomiting type  I am concerned about her presentation and her fairly significant abdominal symptoms including some rebound/peritoneal findings on exam.  Fortunately her lactic acid level is normal.  I have personally reviewed her abdominal x-ray and she has quite a bit of fluid and air in her stomach and some distention in the colon but read as normal by radiology.  She has no free air.  Liver kidney tests are okay white blood cell count mildly elevated with neutrophilia.  Stool test ordered and pending.  Lipase normal urine analysis without evidence of infection or bleeding.  I suspect this is a colitis, gynecologic pathology also considered.  Urine pregnancy test is negative.  Awaiting CT scan today.  - Comprehensive metabolic panel; Future  - CBC with platelets and differential; Future  - Lactic acid whole blood; Future  - CT Abdomen Pelvis w Contrast; Future  - Comprehensive metabolic panel  - CBC with platelets and differential  - Lactic acid whole blood    2. Diarrhea, unspecified type  - Comprehensive metabolic panel; Future  - CBC with platelets and differential; Future  - Lactic acid whole blood; Future  - Enteric Bacteria and Virus Panel by MATT Stool; Future  - C. difficile Toxin B PCR with reflex to C. difficile Antigen and Toxins A/B EIA; Future  - Lipase; Future  - UA Macro with Reflex to Micro and Culture - lab collect; Future  - CT Abdomen Pelvis w Contrast; Future  - Comprehensive metabolic panel  - CBC with platelets and differential  - Lactic acid whole blood  -  Enteric Bacteria and Virus Panel by MATT Stool  - C. difficile Toxin B PCR with reflex to C. difficile Antigen and Toxins A/B EIA  - Lipase  - UA Macro with Reflex to Micro and Culture - lab collect    3. Acute abdomen  - Comprehensive metabolic panel; Future  - CBC with platelets and differential; Future  - Lactic acid whole blood; Future  - Enteric Bacteria and Virus Panel by MATT Stool; Future  - C. difficile Toxin B PCR with reflex to C. difficile Antigen and Toxins A/B EIA; Future  - Lipase; Future  - HCG qualitative urine; Future  - XR Abdomen 2 Views; Future  - CT Abdomen Pelvis w Contrast; Future  - Comprehensive metabolic panel  - CBC with platelets and differential  - Lactic acid whole blood  - Enteric Bacteria and Virus Panel by MATT Stool  - C. difficile Toxin B PCR with reflex to C. difficile Antigen and Toxins A/B EIA  - Lipase  - HCG qualitative urine    4. Abnormal menses  - Comprehensive metabolic panel; Future  - CBC with platelets and differential; Future  - Lactic acid whole blood; Future  - HCG qualitative urine; Future  - CT Abdomen Pelvis w Contrast; Future  - Comprehensive metabolic panel  - CBC with platelets and differential  - Lactic acid whole blood  - HCG qualitative urine    5. Fever, unspecified fever cause  - Comprehensive metabolic panel; Future  - CBC with platelets and differential; Future  - Lactic acid whole blood; Future  - Enteric Bacteria and Virus Panel by MATT Stool; Future  - C. difficile Toxin B PCR with reflex to C. difficile Antigen and Toxins A/B EIA; Future  - Lipase; Future  - CT Abdomen Pelvis w Contrast; Future  - Comprehensive metabolic panel  - CBC with platelets and differential  - Lactic acid whole blood  - Enteric Bacteria and Virus Panel by MATT Stool  - C. difficile Toxin B PCR with reflex to C. difficile Antigen and Toxins A/B EIA  - Lipase    Return in about 1 week (around 3/31/2023) for Office visit, if symptoms worsen/fail to improve.     History of Present  "Illness   This 37 year old woman comes in for evaluation of acute abdominal symptoms.  Last Wednesday at work she just did not feel right and felt like she is going to pass out.  This seemed to pass and she did okay until this weekend when she had the onset of her menstrual cycle and had severe abdominal pain bloating lightheadedness even some disorientation to the point that she almost called paramedics.  This seemed to pass somewhat and she had a light but otherwise normal menstrual cycle.  This past week she has continued to have diffuse abdominal pain, onset of nausea and vomiting, frequent 15+ diarrhea per day, subjective fever and chills.  She said it hurt with every bump on the ride over in her abdomen.  She has had no urinary symptoms no back pain.  Her  recently had a vasectomy and had a test for effectiveness 2 weeks ago.  They stopped using condoms 2 weeks ago.  She has not done a urine pregnancy test.  No history of abdominal surgery.  She does use intermittent antibiotics for UTI prophylaxis post intercourse.       Physical Exam   General Appearance:   She appears uncomfortable    /81 (BP Location: Left arm, Patient Position: Sitting, Cuff Size: Adult Regular)   Pulse 101   Temp 98.2  F (36.8  C) (Oral)   Resp 16   Ht 1.676 m (5' 6\")   Wt 63.5 kg (140 lb)   SpO2 100%   BMI 22.60 kg/m      She is slightly tachycardic, lungs clear she has no abdominal distention.  She has diffuse tenderness with percussion and is quite tender especially in the left lower quadrant and left upper quadrant with palpation.  She is slightly diaphoretic.     Additional Information   Current Outpatient Medications   Medication Sig Dispense Refill     Calcium-Magnesium-Vitamin D (CALCIUM 500 PO) Take 1 tablet by mouth daily       cephALEXin (KEFLEX) 250 MG capsule Take 1 cap by mouth once daily as needed for UTI prevention. 30 capsule 1     fexofenadine (ALLEGRA) 180 MG tablet Take 180 mg by mouth daily       " predniSONE (DELTASONE) 10 MG tablet Take 1 tablet (10 mg) by mouth daily As needed for allergic 30 tablet 1     Vitamin D3 (CHOLECALCIFEROL) 125 MCG (5000 UT) tablet Take by mouth daily       Allergies   Allergen Reactions     Macrobid [Nitrofurantoin]      Pertussis Vaccine Other (See Comments)     Lymphadenopathy, pain     Sulfa Drugs        Time:      Simon Kendrick MD  Answers for HPI/ROS submitted by the patient on 3/24/2023  How many servings of fruits and vegetables do you eat daily?: 2-3  On average, how many sweetened beverages do you drink each day (Examples: soda, juice, sweet tea, etc.  Do NOT count diet or artificially sweetened beverages)?: 0  How many minutes a day do you exercise enough to make your heart beat faster?: 30 to 60  How many days a week do you exercise enough to make your heart beat faster?: 4  How many days per week do you miss taking your medication?: 0  What is the reason for your visit today?: GI symptoms persisting and escalating following dysmenorrea episode  When did your symptoms begin?: 3-7 days ago  What are your symptoms?: Abdominal pain, diarrhea, vomiting, chills, loss of appetite, epigastric pain, metallic taste?  How would you describe these symptoms?: Severe  Are your symptoms:: Worsening  Have you had these symptoms before?: No  Is there anything that makes you feel worse?: Moving  Is there anything that makes you feel better?: No

## 2023-03-25 LAB — C DIFF TOX B STL QL: NEGATIVE

## 2023-03-30 ENCOUNTER — OFFICE VISIT (OUTPATIENT)
Dept: OBGYN | Facility: CLINIC | Age: 38
End: 2023-03-30
Payer: COMMERCIAL

## 2023-03-30 VITALS — SYSTOLIC BLOOD PRESSURE: 140 MMHG | BODY MASS INDEX: 22.06 KG/M2 | DIASTOLIC BLOOD PRESSURE: 92 MMHG | WEIGHT: 136.7 LBS

## 2023-03-30 DIAGNOSIS — K52.9 GASTROENTERITIS: ICD-10-CM

## 2023-03-30 DIAGNOSIS — D25.9 UTERINE LEIOMYOMA, UNSPECIFIED LOCATION: Primary | ICD-10-CM

## 2023-03-30 PROCEDURE — 99203 OFFICE O/P NEW LOW 30 MIN: CPT | Performed by: OBSTETRICS & GYNECOLOGY

## 2023-03-30 NOTE — PROGRESS NOTES
"S; Paula Larson is a 37 year old  who was referred for a visit by her PCP, Simon Kendrick MD after significant changes during her last period.  She reports that her periods have always been heavy with pelvic, back, hip and leg pain during menses.  They have been regular without intermenstrual spotting.  She has known fibroids.  About 2 weeks ago she had an episode of feeling very faint and had a near syncopal episode at work.  She then developed a lot of fullness and belching and her period started about 2 days later.  During that period, she had much more abdominal pain bloating than usual and about a week after menses, she had acute episode of extreme abdominal pain, vomiting and diarrhea.  She was seen same day by Dr. Kendrick and had full eval for acute abdomen as he thought she may have an appendicitis per her report.  She had abd xray and CT scan that were normal.  Pelvic organs were normal other than fibroids, no ovarian findings or free fluid.  Stool and lab testing were negative.    That episode resolved within a day or so, she feels better, although continues to feel slightly full and \"burpy\".  She had one day of light pink spotting a few days after menses ended, and has dull RLQ pain.  Of note, she has used OCPs and mirena IUD in the past to help control her periods but has not liked how either made her feel.    Visit notes from Dr. Kendrick, CT and xray reports and multiple lab reports reviewed on day of visit.    Past Medical History:   Diagnosis Date     Fibroid uterus      Past Surgical History:   Procedure Laterality Date     THORACIC OUTLET SURGERY       OB History    Para Term  AB Living   0 0 0 0 0 0   SAB IAB Ectopic Multiple Live Births   0 0 0 0 0        Allergies   Allergen Reactions     Macrobid [Nitrofurantoin]      Pertussis Vaccine Other (See Comments)     Lymphadenopathy, pain     Sulfa Drugs        Current Outpatient Medications:      Calcium-Magnesium-Vitamin D " (CALCIUM 500 PO), Take 1 tablet by mouth daily, Disp: , Rfl:      cephALEXin (KEFLEX) 250 MG capsule, Take 1 cap by mouth once daily as needed for UTI prevention., Disp: 30 capsule, Rfl: 1     fexofenadine (ALLEGRA) 180 MG tablet, Take 180 mg by mouth daily, Disp: , Rfl:      predniSONE (DELTASONE) 10 MG tablet, Take 1 tablet (10 mg) by mouth daily As needed for allergic, Disp: 30 tablet, Rfl: 1     Vitamin D3 (CHOLECALCIFEROL) 125 MCG (5000 UT) tablet, Take by mouth daily, Disp: , Rfl:     Social History     Socioeconomic History     Marital status:      Spouse name: Not on file     Number of children: Not on file     Years of education: Not on file     Highest education level: Not on file   Occupational History     Not on file   Tobacco Use     Smoking status: Never     Smokeless tobacco: Never   Substance and Sexual Activity     Alcohol use: Yes     Alcohol/week: 5.0 standard drinks     Drug use: Never     Sexual activity: Yes     Partners: Male   Other Topics Concern     Not on file   Social History Narrative    Lives with her , Tony.  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       Social Determinants of Health     Financial Resource Strain: Not on file   Food Insecurity: Not on file   Transportation Needs: Not on file   Physical Activity: Not on file   Stress: Not on file   Social Connections: Not on file   Intimate Partner Violence: Not on file   Housing Stability: Not on file     Family History   Problem Relation Age of Onset     IgA nephropathy Mother      Coronary Artery Disease Early Onset Father      Hypertension Father      Coronary Artery Disease Father 52     Hyperlipidemia Father      Deep Vein Thrombosis Father      Melanoma Sister      Deep Vein Thrombosis Paternal Grandmother      Colon Cancer Paternal Grandfather 42     Past medical, surgical, social and family history were reviewed and updated in EPIC.    PE: BP (!) 140/92   Wt 62 kg (136 lb 11.2 oz)   LMP 03/19/2023 (Exact Date)    "BMI 22.06 kg/m      Gen: Healthy appearing female in NAD    A/P: uterine fibroids with menorrhagia, gastroenteritis   We discussed her \"normal\" periods and recent events in detail.  I suspect these were unrelated other than timing, although hormonal changes of menses may have worsened the symptoms of her gastroenteritis or vice versa.  I explained that many women will have changes to menses during this age range due to changing hormones, but her symptoms are not typical. I explained we will know more over the next several months if she is having changes to her menses vs this being a \"one off\".  They are becoming more unmanageable, we can discuss options for management, but that would likely be with the aid of hormonal therapies which she has not previously tolerated well.   I explained that although a CT scan is not the preferred imaging for pelvic organs, no pathology was noted and no need to do additional imaging unless symptoms of pain or spotting persist over time.  If she continues to have these symptoms, she can let me know and we will get her in for pelvic us.  Questions answered    RONALD ADAME MD                 "

## 2023-07-19 ENCOUNTER — OFFICE VISIT (OUTPATIENT)
Dept: FAMILY MEDICINE | Facility: CLINIC | Age: 38
End: 2023-07-19
Payer: COMMERCIAL

## 2023-07-19 VITALS
OXYGEN SATURATION: 100 % | DIASTOLIC BLOOD PRESSURE: 93 MMHG | BODY MASS INDEX: 21.69 KG/M2 | TEMPERATURE: 98.8 F | WEIGHT: 135 LBS | SYSTOLIC BLOOD PRESSURE: 153 MMHG | HEART RATE: 66 BPM | RESPIRATION RATE: 16 BRPM | HEIGHT: 66 IN

## 2023-07-19 DIAGNOSIS — L50.9 HIVES: Primary | ICD-10-CM

## 2023-07-19 PROCEDURE — 99213 OFFICE O/P EST LOW 20 MIN: CPT | Performed by: FAMILY MEDICINE

## 2023-07-19 RX ORDER — PREDNISONE 20 MG/1
60 TABLET ORAL DAILY
Qty: 15 TABLET | Refills: 0 | Status: SHIPPED | OUTPATIENT
Start: 2023-07-19 | End: 2023-07-24

## 2023-07-19 RX ORDER — HYDROXYZINE HYDROCHLORIDE 25 MG/1
25 TABLET, FILM COATED ORAL EVERY 6 HOURS PRN
Qty: 30 TABLET | Refills: 0 | Status: SHIPPED | OUTPATIENT
Start: 2023-07-19 | End: 2024-09-04

## 2023-07-19 RX ORDER — TRIAMCINOLONE ACETONIDE 1 MG/G
CREAM TOPICAL 2 TIMES DAILY
Qty: 30 G | Refills: 1 | Status: SHIPPED | OUTPATIENT
Start: 2023-07-19

## 2023-07-19 NOTE — PATIENT INSTRUCTIONS
Take prednisone burst as prescribed.  You may split the dose so that you take 40 mg in the morning and 20 mg in the evening if this is more effective in helping you with the itch through the night.  You may also take hydroxyzine as needed.  Use the triamcinolone cream as needed as well.  You may also take Benadryl at night as well.  Follow-up if symptoms are getting worse or not improving in a couple of days.

## 2023-07-19 NOTE — PROGRESS NOTES
Assessment:       Hives  - predniSONE (DELTASONE) 20 MG tablet  Dispense: 15 tablet; Refill: 0  - triamcinolone (KENALOG) 0.1 % external cream  Dispense: 30 g; Refill: 1  - hydrOXYzine (ATARAX) 25 MG tablet  Dispense: 30 tablet; Refill: 0         Plan:     Patient with hives, unclear etiology.  Possible exposure at the [place] that she was golfing at a couple of days ago.  We will give her higher dose of prednisone than what she was taking over the past couple days as well as hydroxyzine.  May continue with Benadryl and triamcinolone cream to apply topically to the hives as well for itch.  Follow-up if symptoms getting worse or not improving.    MEDICATIONS:   Orders Placed This Encounter   Medications     predniSONE (DELTASONE) 20 MG tablet     Sig: Take 3 tablets (60 mg) by mouth daily for 5 days     Dispense:  15 tablet     Refill:  0     triamcinolone (KENALOG) 0.1 % external cream     Sig: Apply topically 2 times daily     Dispense:  30 g     Refill:  1     hydrOXYzine (ATARAX) 25 MG tablet     Sig: Take 1 tablet (25 mg) by mouth every 6 hours as needed for itching     Dispense:  30 tablet     Refill:  0         Subjective:       37 year old female presents for evaluation of hives.  She is not sure what she was exposed to but had been golfing and is wondering about possible chemicals on the golf course that she really touched.  She denies shortness of breath or wheezing.  Started 3 days ago.  She has been taking Benadryl as well as some prednisone that she had at home and has not taken 3 days of prednisone 40 mg.  She is still miserable.  The prednisone does seem to help somewhat but the hives keep coming back the next day.  She is now out of prednisone and is wondering what else she can do.  No difficulty breathing or swallowing.    Patient Active Problem List   Diagnosis     Frequent UTI     Dysmenorrhea       Past Medical History:   Diagnosis Date     Fibroid uterus        Past Surgical History:  "  Procedure Laterality Date     THORACIC OUTLET SURGERY  2002       Current Outpatient Medications   Medication     Calcium-Magnesium-Vitamin D (CALCIUM 500 PO)     cephALEXin (KEFLEX) 250 MG capsule     fexofenadine (ALLEGRA) 180 MG tablet     hydrOXYzine (ATARAX) 25 MG tablet     predniSONE (DELTASONE) 10 MG tablet     predniSONE (DELTASONE) 20 MG tablet     triamcinolone (KENALOG) 0.1 % external cream     Vitamin D3 (CHOLECALCIFEROL) 125 MCG (5000 UT) tablet     No current facility-administered medications for this visit.       Allergies   Allergen Reactions     Pertussis Vaccine Other (See Comments)     Lymphadenopathy, pain     Sulfa Antibiotics        Family History   Problem Relation Age of Onset     IgA nephropathy Mother      Coronary Artery Disease Early Onset Father      Hypertension Father      Coronary Artery Disease Father 52     Hyperlipidemia Father      Deep Vein Thrombosis Father      Melanoma Sister      Deep Vein Thrombosis Paternal Grandmother      Colon Cancer Paternal Grandfather 42       Social History     Socioeconomic History     Marital status:      Spouse name: None     Number of children: None     Years of education: None     Highest education level: None   Tobacco Use     Smoking status: Never     Smokeless tobacco: Never   Substance and Sexual Activity     Alcohol use: Yes     Alcohol/week: 5.0 standard drinks of alcohol     Drug use: Never     Sexual activity: Yes     Partners: Male   Social History Narrative    Lives with her , Tony.  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           Review of Systems  Pertinent items are noted in HPI.      Objective:     BP (!) 153/93 (BP Location: Right arm, Patient Position: Sitting, Cuff Size: Adult Regular)   Pulse 66   Temp 98.8  F (37.1  C) (Oral)   Resp 16   Ht 1.676 m (5' 6\")   Wt 61.2 kg (135 lb)   SpO2 100%   BMI 21.79 kg/m       General appearance: alert, appears stated age and cooperative  Throat: lips, mucosa, and " tongue normal; teeth and gums normal  Lungs: clear to auscultation bilaterally  Skin: Several patches of urticaria on patient's legs and hips.       This note has been dictated using voice recognition software. Any grammatical or context distortions are unintentional and inherent to the software

## 2024-05-04 ENCOUNTER — HEALTH MAINTENANCE LETTER (OUTPATIENT)
Age: 39
End: 2024-05-04

## 2024-09-04 ENCOUNTER — OFFICE VISIT (OUTPATIENT)
Dept: INTERNAL MEDICINE | Facility: CLINIC | Age: 39
End: 2024-09-04
Payer: COMMERCIAL

## 2024-09-04 VITALS
HEART RATE: 74 BPM | BODY MASS INDEX: 21.86 KG/M2 | HEIGHT: 66 IN | OXYGEN SATURATION: 99 % | RESPIRATION RATE: 12 BRPM | DIASTOLIC BLOOD PRESSURE: 98 MMHG | WEIGHT: 136 LBS | TEMPERATURE: 97.8 F | SYSTOLIC BLOOD PRESSURE: 137 MMHG

## 2024-09-04 DIAGNOSIS — Z13.1 SCREENING FOR DIABETES MELLITUS: ICD-10-CM

## 2024-09-04 DIAGNOSIS — R03.0 ELEVATED BLOOD PRESSURE READING WITHOUT DIAGNOSIS OF HYPERTENSION: ICD-10-CM

## 2024-09-04 DIAGNOSIS — Z80.8 FAMILY HISTORY OF MALIGNANT MELANOMA: ICD-10-CM

## 2024-09-04 DIAGNOSIS — Z00.00 ANNUAL PHYSICAL EXAM: Primary | ICD-10-CM

## 2024-09-04 DIAGNOSIS — Z13.220 SCREENING FOR HYPERLIPIDEMIA: ICD-10-CM

## 2024-09-04 DIAGNOSIS — Z12.11 ENCOUNTER FOR SCREENING COLONOSCOPY: ICD-10-CM

## 2024-09-04 DIAGNOSIS — D25.9 UTERINE LEIOMYOMA, UNSPECIFIED LOCATION: ICD-10-CM

## 2024-09-04 DIAGNOSIS — Z80.0 FAMILY HISTORY OF COLON CANCER: ICD-10-CM

## 2024-09-04 DIAGNOSIS — Z12.4 CERVICAL CANCER SCREENING: ICD-10-CM

## 2024-09-04 PROBLEM — G43.009 NONINTRACTABLE MIGRAINE, UNSPECIFIED MIGRAINE TYPE: Status: ACTIVE | Noted: 2019-05-15

## 2024-09-04 PROBLEM — D68.51 HETEROZYGOUS FACTOR V LEIDEN MUTATION (H): Status: ACTIVE | Noted: 2019-05-15

## 2024-09-04 LAB
ALBUMIN UR-MCNC: NEGATIVE MG/DL
APPEARANCE UR: CLEAR
BILIRUB UR QL STRIP: NEGATIVE
COLOR UR AUTO: YELLOW
ERYTHROCYTE [DISTWIDTH] IN BLOOD BY AUTOMATED COUNT: 12 % (ref 10–15)
GLUCOSE UR STRIP-MCNC: NEGATIVE MG/DL
HBA1C MFR BLD: 5.5 % (ref 0–5.6)
HCT VFR BLD AUTO: 40.1 % (ref 35–47)
HGB BLD-MCNC: 13.4 G/DL (ref 11.7–15.7)
HGB UR QL STRIP: NEGATIVE
KETONES UR STRIP-MCNC: NEGATIVE MG/DL
LEUKOCYTE ESTERASE UR QL STRIP: NEGATIVE
MCH RBC QN AUTO: 31.5 PG (ref 26.5–33)
MCHC RBC AUTO-ENTMCNC: 33.4 G/DL (ref 31.5–36.5)
MCV RBC AUTO: 94 FL (ref 78–100)
NITRATE UR QL: NEGATIVE
PH UR STRIP: 7.5 [PH] (ref 5–8)
PLATELET # BLD AUTO: 299 10E3/UL (ref 150–450)
RBC # BLD AUTO: 4.25 10E6/UL (ref 3.8–5.2)
SP GR UR STRIP: 1.01 (ref 1–1.03)
UROBILINOGEN UR STRIP-ACNC: 0.2 E.U./DL
WBC # BLD AUTO: 5.4 10E3/UL (ref 4–11)

## 2024-09-04 PROCEDURE — 80053 COMPREHEN METABOLIC PANEL: CPT | Performed by: INTERNAL MEDICINE

## 2024-09-04 PROCEDURE — 36415 COLL VENOUS BLD VENIPUNCTURE: CPT | Performed by: INTERNAL MEDICINE

## 2024-09-04 PROCEDURE — 80061 LIPID PANEL: CPT | Performed by: INTERNAL MEDICINE

## 2024-09-04 PROCEDURE — 99395 PREV VISIT EST AGE 18-39: CPT | Performed by: INTERNAL MEDICINE

## 2024-09-04 PROCEDURE — 84443 ASSAY THYROID STIM HORMONE: CPT | Performed by: INTERNAL MEDICINE

## 2024-09-04 PROCEDURE — 85027 COMPLETE CBC AUTOMATED: CPT | Performed by: INTERNAL MEDICINE

## 2024-09-04 PROCEDURE — 83036 HEMOGLOBIN GLYCOSYLATED A1C: CPT | Performed by: INTERNAL MEDICINE

## 2024-09-04 PROCEDURE — 81003 URINALYSIS AUTO W/O SCOPE: CPT | Performed by: INTERNAL MEDICINE

## 2024-09-04 PROCEDURE — 84156 ASSAY OF PROTEIN URINE: CPT | Performed by: INTERNAL MEDICINE

## 2024-09-04 PROCEDURE — 99213 OFFICE O/P EST LOW 20 MIN: CPT | Mod: 25 | Performed by: INTERNAL MEDICINE

## 2024-09-04 ASSESSMENT — PAIN SCALES - GENERAL: PAINLEVEL: NO PAIN (0)

## 2024-09-04 NOTE — PROGRESS NOTES
Patient here today for EKG test.     Preventive Care Visit  [facility]  Simon Kendrick MD, Internal Medicine  Sep 4, 2024  {Provider  Link to [facility] :341640}    {PROVIDER CHARTING PREFERENCE:422658}    Lino Mitchell is a 38 year old, presenting for the following:  Annual Visit        9/4/2024     7:16 AM   Additional Questions   Roomed by Radha SANDERS        Health Care Directive  Patient does not have a Health Care Directive or Living Will: {ADVANCE_DIRECTIVE_STATUS:723928}    HPI  ***  {MA/LPN/RN Pre-Provider Visit Orders- hCG/UA/Strep (Optional):343836}  {SUPERLIST (Optional):768399}  {additonal problems for provider to add (Optional):073620}      9/3/2024   General Health   How would you rate your overall physical health? Excellent   Feel stress (tense, anxious, or unable to sleep) Only a little      (!) STRESS CONCERN      9/3/2024   Nutrition   Three or more servings of calcium each day? Yes   Diet: Regular (no restrictions)   How many servings of fruit and vegetables per day? (!) 2-3   How many sweetened beverages each day? 0-1            9/3/2024   Exercise   Days per week of moderate/strenous exercise 3 days   Average minutes spent exercising at this level 30 min            9/3/2024   Social Factors   Frequency of gathering with friends or relatives Twice a week   Worry food won't last until get money to buy more No   Food not last or not have enough money for food? No   Do you have housing? (Housing is defined as stable permanent housing and does not include staying ouside in a car, in a tent, in an abandoned building, in an overnight shelter, or couch-surfing.) Yes   Are you worried about losing your housing? No   Lack of transportation? No   Unable to get utilities (heat,electricity)? No            9/3/2024   Dental   Dentist two times every year? Yes            9/3/2024   TB Screening   Were you born outside of the [place]? No            Today's PHQ-2 Score:       9/3/2024     8:31 PM   PHQ-2 ( 1999 Pf[employer])   Q1:  Little interest or pleasure in doing things 0   Q2: Feeling down, depressed or hopeless 0   PHQ-2 Score 0   Q1: Little interest or pleasure in doing things Not at all   Q2: Feeling down, depressed or hopeless Not at all   PHQ-2 Score 0           9/3/2024   Substance Use   Alcohol more than 3/day or more than 7/wk No   Do you use any other substances recreationally? No        Social History     Tobacco Use    Smoking status: Never    Smokeless tobacco: Never   Substance Use Topics    Alcohol use: Yes     Alcohol/week: 5.0 standard drinks of alcohol    Drug use: Never     {Provider  If there are gaps in the social history shown above, please follow the link to update and then refresh the note Link to Social and Substance History :803299}      2/23/2023   LAST FHS-7 RESULTS   1st degree relative breast or ovarian cancer No   Any relative bilateral breast cancer No   Any male have breast cancer No   Any ONE woman have BOTH breast AND ovarian cancer Yes   Any woman with breast cancer before 50yrs No   2 or more relatives with breast AND/OR ovarian cancer No   2 or more relatives with breast AND/OR bowel cancer Yes        {If any of the questions to the FHS7 are answered yes, consider referral for genetic counseling.    Additional indications for genetic referral include personal history of breast or ovarian cancer, genetic mutation in 1st degree relative which increases risk of breast cancer including BRCA1, BRCA2, LAURI, PALB 2, TP53, CHEK2, PTEN, CDH1, STK11 (per ACS) and/or 1st degree relative with history of pancreatic or high-risk prostate cancer (per NCCN):383030}   {Mammogram Decision Support (Optional):928561}        9/3/2024   STI Screening   New sexual partner(s) since last STI/HIV test? No        History of abnormal Pap smear: { :981236}        Latest Ref Rng & Units 5/15/2019    12:35 PM 5/15/2019    10:50 AM   PAP / HPV   PAP (Historical)  NIL     HPV 16 DNA NEG^Negative  Negative    HPV 18 DNA NEG^Negative   "Negative    Other HR HPV NEG^Negative  Negative            9/3/2024   Contraception/Family Planning   Questions about contraception or family planning No        {Provider  REQUIRED FOR AWV Use the storyboard to review patient history, after sections have been marked as reviewed, refresh note to capture documentation:879952}   Reviewed and updated as needed this visit by Provider                    {HISTORY OPTIONS (Optional):590703}    {ROS Picklists (Optional):634872}     Objective    Exam  BP (!) 139/92 (BP Location: Left arm, Patient Position: Sitting, Cuff Size: Adult Regular)   Pulse 74   Temp 97.8  F (36.6  C) (Tympanic)   Resp 12   Ht 1.676 m (5' 5.98\")   Wt 61.7 kg (136 lb)   LMP 08/29/2024 (Approximate)   SpO2 99%   BMI 21.96 kg/m     Estimated body mass index is 21.96 kg/m  as calculated from the following:    Height as of this encounter: 1.676 m (5' 5.98\").    Weight as of this encounter: 61.7 kg (136 lb).    Physical Exam  {Exam Choices (Optional):022986}        Signed Electronically by: Simon Kendrick MD  {Email feedback regarding this note to [email or web address]   :907995}  "

## 2024-09-04 NOTE — PATIENT INSTRUCTIONS
Gynecology, Daphney Weeks MD - [facility]    Check blood pressure at home - goal <130/80, send me results via [facility] in 1 month  
(M6) obeys commands

## 2024-09-04 NOTE — PROGRESS NOTES
Office Visit - Physical   Paula Larson   38 year old  female    Date of visit: 9/4/2024  Physician: Simon Kendrick MD     Assessment and Plan   1. Annual physical exam  This is a 38-year-old woman with issues as discussed below.  Ongoing healthy lifestyle discussed and recommended    2. Screening for diabetes mellitus  - Hemoglobin A1c; Future  - Hemoglobin A1c    3. Screening for hyperlipidemia  - Lipid panel reflex to direct LDL Fasting; Future  - Lipid panel reflex to direct LDL Fasting    4. Family history of malignant melanoma  She will be seeing dermatology later this year    5. Encounter for screening colonoscopy  6. Family history of colon cancer  Grandfather had colon cancer and her father had polyps in his mid 40s and her sister recently had polyps as well.  I am recommending she start screening for colon cancer now  - Colonoscopy Screening  Referral; Future    7. Cervical cancer screening  8. Uterine leiomyoma, unspecified location  I have recommended that she establish care with Dr. Meyers    9. Elevated blood pressure reading without diagnosis of hypertension  May be some whitecoat hypertension.  Check labs as below.  She will monitor blood pressure at home over the next month and send me readings.  Also discussed 24-hour blood pressure monitor which we can consider in the future.  - CBC with platelets; Future  - Comprehensive metabolic panel; Future  - TSH with free T4 reflex; Future  - UA Macroscopic with reflex to Microscopic and Culture; Future  - Protein  random urine; Future  - CBC with platelets  - Comprehensive metabolic panel  - TSH with free T4 reflex  - UA Macroscopic with reflex to Microscopic and Culture  - Protein  random urine    Return in about 4 weeks (around 10/2/2024) for using a MyChart eVisit.     Chief Complaint   Chief Complaint   Patient presents with    Annual Visit        Patient Profile   Social History     Social History Narrative    Lives with her ,  Tony.  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          Past Medical History   Patient Active Problem List   Diagnosis    Frequent UTI    Dysmenorrhea    Heterozygous factor V Leiden mutation (H24)    Nonintractable migraine, unspecified migraine type    Family history of malignant melanoma    Family history of colon cancer       Past Surgical History  She has a past surgical history that includes Thoracic Outlet Surgery (2002).     History of Present Illness   This 38 year old woman comes in for annual physical.  Overall she is quite healthy.  She has not been exercising as much as she would like.  She has had some elevated blood pressures in the past, likely whitecoat hypertension and even had a duplex ultrasound of her renal arteries while she was in college.  This was normal.  She has a family history of colon cancer in paternal grandfather, her father had polyps at a young age and her sister recently was found to have polyps.  Family history of melanoma    Review of Systems: A comprehensive review of systems was negative except as noted.     Medications and Allergies   Current Outpatient Medications   Medication Sig Dispense Refill    Calcium-Magnesium-Vitamin D (CALCIUM 500 PO) Take 1 tablet by mouth daily      fexofenadine (ALLEGRA) 180 MG tablet Take 180 mg by mouth daily      predniSONE (DELTASONE) 10 MG tablet Take 1 tablet (10 mg) by mouth daily As needed for allergic 30 tablet 1    triamcinolone (KENALOG) 0.1 % external cream Apply topically 2 times daily 30 g 1    Vitamin D3 (CHOLECALCIFEROL) 125 MCG (5000 UT) tablet Take by mouth daily       Allergies   Allergen Reactions    Macrobid [Nitrofurantoin] Itching    Pertussis Vaccine Other (See Comments)     Lymphadenopathy, pain    Sulfa Antibiotics         Family and Social History   Family History   Problem Relation Age of Onset    IgA nephropathy Mother     Coronary Artery Disease Early Onset Father     Hypertension Father     Coronary Artery Disease  "Father 51    Hyperlipidemia Father     Deep Vein Thrombosis Father     Depression Father     Colon Polyps Father     Melanoma Sister     Colon Polyps Sister     Deep Vein Thrombosis Paternal Grandmother     Colon Cancer Paternal Grandfather 42        Social History     Tobacco Use    Smoking status: Never    Smokeless tobacco: Never   Substance Use Topics    Alcohol use: Yes     Alcohol/week: 5.0 standard drinks of alcohol    Drug use: Never        Physical Exam   General Appearance:   No acute distress    BP (!) 137/98   Pulse 74   Temp 97.8  F (36.6  C) (Tympanic)   Resp 12   Ht 1.676 m (5' 5.98\")   Wt 61.7 kg (136 lb)   LMP 08/29/2024 (Approximate)   SpO2 99%   BMI 21.96 kg/m      EYES: Eyelids, conjunctiva, and sclera were normal. Pupils were normal. Cornea, iris, and lens were normal bilaterally.  HEAD, EARS, NOSE, MOUTH, AND THROAT: Head and face were normal. Hearing was normal to voice and the ears were normal to external exam. Nose appearance was normal and there was no discharge.  NECK: Neck appearance was normal.   RESPIRATORY: Breathing pattern was normal and the chest moved symmetrically.   Lung sounds were normal and there were no abnormal sounds.  CARDIOVASCULAR: Heart rate and rhythm were normal.  S1 and S2 were normal and there were no extra sounds or murmurs. Peripheral pulses in arms and legs were normal.  Jugular venous pressure was normal.  There was no peripheral edema.  GASTROINTESTINAL: The abdomen was normal in contour.  Bowel sounds were present.  Percussion detected no organ enlargement or tenderness.  Palpation detected no tenderness, mass, or enlarged organs.   MUSCULOSKELETAL: Skeletal configuration was normal and muscle mass was normal for age. Joint appearance was overall normal.  NEUROLOGIC: The patient was alert and oriented to person, place, time, and circumstance. Speech was normal. Cranial nerves were normal. Motor strength was normal for age. The patient was normally " coordinated.  PSYCHIATRIC:  Mood and affect were normal and the patient had normal recent and remote memory. The patient's judgment and insight were normal.       Additional Information        Simon Kendrick MD  Internal Medicine  Contact me at [phone]

## 2024-09-05 ENCOUNTER — TELEPHONE (OUTPATIENT)
Dept: INTERNAL MEDICINE | Facility: CLINIC | Age: 39
End: 2024-09-05
Payer: COMMERCIAL

## 2024-09-05 LAB
ALBUMIN MFR UR ELPH: <6 MG/DL
ALBUMIN SERPL BCG-MCNC: 4.8 G/DL (ref 3.5–5.2)
ALP SERPL-CCNC: 57 U/L (ref 40–150)
ALT SERPL W P-5'-P-CCNC: 11 U/L (ref 0–50)
ANION GAP SERPL CALCULATED.3IONS-SCNC: 11 MMOL/L (ref 7–15)
AST SERPL W P-5'-P-CCNC: 17 U/L (ref 0–45)
BILIRUB SERPL-MCNC: 0.7 MG/DL
BUN SERPL-MCNC: 12.4 MG/DL (ref 6–20)
CALCIUM SERPL-MCNC: 9.7 MG/DL (ref 8.8–10.4)
CHLORIDE SERPL-SCNC: 104 MMOL/L (ref 98–107)
CHOLEST SERPL-MCNC: 210 MG/DL
CREAT SERPL-MCNC: 0.78 MG/DL (ref 0.51–0.95)
CREAT UR-MCNC: 50.4 MG/DL
EGFRCR SERPLBLD CKD-EPI 2021: >90 ML/MIN/1.73M2
FASTING STATUS PATIENT QL REPORTED: YES
FASTING STATUS PATIENT QL REPORTED: YES
GLUCOSE SERPL-MCNC: 98 MG/DL (ref 70–99)
HCO3 SERPL-SCNC: 25 MMOL/L (ref 22–29)
HDLC SERPL-MCNC: 78 MG/DL
LDLC SERPL CALC-MCNC: 111 MG/DL
NONHDLC SERPL-MCNC: 132 MG/DL
POTASSIUM SERPL-SCNC: 4.8 MMOL/L (ref 3.4–5.3)
PROT SERPL-MCNC: 7.3 G/DL (ref 6.4–8.3)
PROT/CREAT 24H UR: NORMAL MG/G{CREAT}
SODIUM SERPL-SCNC: 140 MMOL/L (ref 135–145)
TRIGL SERPL-MCNC: 104 MG/DL
TSH SERPL DL<=0.005 MIU/L-ACNC: 3.12 UIU/ML (ref 0.3–4.2)

## 2024-09-05 NOTE — TELEPHONE ENCOUNTER
September 5, 2024    Biometric Validation Form was received via fax for Dr. Kendrick.  Patient label was attached to paperwork and placed in provider's inbox to be signed.    Keiko Pugh

## 2024-09-09 NOTE — TELEPHONE ENCOUNTER
September 9, 2024    Biometric Validation Form was picked up from outbox of Dr. Kendrick and placed at the  for pickup.    Pt notified    Keiko Pugh

## 2024-11-07 ENCOUNTER — OFFICE VISIT (OUTPATIENT)
Dept: DERMATOLOGY | Facility: CLINIC | Age: 39
End: 2024-11-07
Payer: COMMERCIAL

## 2024-11-07 DIAGNOSIS — Z80.8 FAMILY HISTORY OF MALIGNANT MELANOMA: Primary | ICD-10-CM

## 2024-11-07 DIAGNOSIS — L81.4 LENTIGINES: ICD-10-CM

## 2024-11-07 DIAGNOSIS — D22.9 MULTIPLE BENIGN NEVI: ICD-10-CM

## 2024-11-07 PROCEDURE — 99203 OFFICE O/P NEW LOW 30 MIN: CPT | Performed by: STUDENT IN AN ORGANIZED HEALTH CARE EDUCATION/TRAINING PROGRAM

## 2024-11-07 ASSESSMENT — PAIN SCALES - GENERAL: PAINLEVEL_OUTOF10: NO PAIN (0)

## 2024-11-07 NOTE — LETTER
11/7/2024       RE: Paula Larson  [address]  [place] [address]     Dear Colleague,    Thank you for referring your patient, Paula Larson, to the [facility] DERMATOLOG[facility] [place] at [facility]. Please see a copy of my visit note below.    I have personally examined this patient and agree with the medical student's documentation and plan of care. I have reviewed and amended the medical student's note as necessary.The documentation accurately reflects my clinical observations, diagnoses, treatment and follow-up plans.     Burak Claros MD  Dermatology Staff      [facility] Dermatology Note  Encounter Date: Nov 7, 2024  Office Visit     Dermatology Problem List:  1. Family history of melanoma (sister)  ____________________________________________    Assessment & Plan:    # Family history of melanoma   # Multiple clinically benign melanocytic nevi    # Lentigines  - ABCDEs: Counseled ABCDEs of melanoma: Asymmetry, Border (irregularity), Color (not uniform, changes in color), Diameter (greater than 6 mm which is about the size of a pencil eraser), and Evolving (any changes in preexisting moles).  - Monitor: Patient to continue monitoring at home and will contact the clinic for any changes.  - Sun protection: Counseled SPF30+ sunscreen, UPF clothing, sun avoidance, tanning bed avoidance.   - Discussed FBSE at intervals that patient is comfortable with, she elects for annual examinations     Follow-up: 1 year(s) in-person, or earlier for new or changing lesions    Staff and Medical Student:     Darling Rivas, MS4  [facility]    ____________________________________________    CC: Skin Check (Paula is here today for a full body skin examination. No new concerns. Family hx of melanoma.)    HPI:  Ms. Paula Larson is a(n) 38 year old female who presents today as a new patient for FBSE. She has a family history of  melanoma in her sister. This was treated with an excision and her sister required no further treatments. She has no specific skin concerns. She has had several moles that she has been told to watch in the past and reports no changes to these moles.     She notes that she has been using some minoxidil topically in her hair, but this as caused some irritation of her scalp that resolves with stopping minoxidil. She works for a [employer] and has options for different formulations of minoxidil if she decides to continue. Is not interested in any other treatments for her scalp irritation at this time.     Patient is otherwise feeling well, without additional skin concerns.    Physical Exam:  Vitals: There were no vitals taken for this visit.  SKIN: Total skin excluding the undergarment areas was performed. The exam included the head/face, neck, both arms, chest, back, abdomen, both legs, digits and/or nails.   - Multiple regular and uniform medium brown pigmented macules and papules are identified on the trunk, back, and extremities.   - No other lesions of concern on areas examined.     Medications:  Current Outpatient Medications   Medication Sig Dispense Refill     Calcium-Magnesium-Vitamin D (CALCIUM 500 PO) Take 1 tablet by mouth daily       fexofenadine (ALLEGRA) 180 MG tablet Take 180 mg by mouth daily       predniSONE (DELTASONE) 10 MG tablet Take 1 tablet (10 mg) by mouth daily As needed for allergic 30 tablet 1     progesterone vaginal cream        triamcinolone (KENALOG) 0.1 % external cream Apply topically 2 times daily 30 g 1     Vitamin D3 (CHOLECALCIFEROL) 125 MCG (5000 UT) tablet Take by mouth daily       No current facility-administered medications for this visit.      Past Medical History:   Patient Active Problem List   Diagnosis     Frequent UTI     Dysmenorrhea     Heterozygous factor V Leiden mutation (H)     Nonintractable migraine, unspecified migraine type     Family history of  malignant melanoma     Family history of colon cancer     Past Medical History:   Diagnosis Date     Fibroid uterus         CC Referred Self, MD  No address on file on close of this encounter.      Again, thank you for allowing me to participate in the care of your patient.      Sincerely,    Burak Claros MD

## 2024-11-07 NOTE — PROGRESS NOTES
I have personally examined this patient and agree with the medical student's documentation and plan of care. I have reviewed and amended the medical student's note as necessary.The documentation accurately reflects my clinical observations, diagnoses, treatment and follow-up plans.     Burak Claros MD  Dermatology Staff      [facility] Dermatology Note  Encounter Date: Nov 7, 2024  Office Visit     Dermatology Problem List:  1. Family history of melanoma (sister)  ____________________________________________    Assessment & Plan:    # Family history of melanoma   # Multiple clinically benign melanocytic nevi    # Lentigines  - ABCDEs: Counseled ABCDEs of melanoma: Asymmetry, Border (irregularity), Color (not uniform, changes in color), Diameter (greater than 6 mm which is about the size of a pencil eraser), and Evolving (any changes in preexisting moles).  - Monitor: Patient to continue monitoring at home and will contact the clinic for any changes.  - Sun protection: Counseled SPF30+ sunscreen, UPF clothing, sun avoidance, tanning bed avoidance.   - Discussed FBSE at intervals that patient is comfortable with, she elects for annual examinations     Follow-up: 1 year(s) in-person, or earlier for new or changing lesions    Staff and Medical Student:     Darling Rivas, MS4  [facility]    ____________________________________________    CC: Skin Check (Paula is here today for a full body skin examination. No new concerns. Family hx of melanoma.)    HPI:  Ms. Paula Larson is a(n) 38 year old female who presents today as a new patient for FBSE. She has a family history of melanoma in her sister. This was treated with an excision and her sister required no further treatments. She has no specific skin concerns. She has had several moles that she has been told to watch in the past and reports no changes to these moles.     She notes that she has been using some minoxidil topically in her  hair, but this as caused some irritation of her scalp that resolves with stopping minoxidil. 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 Is not interested in any other treatments for her scalp irritation at this time.     Patient is otherwise feeling well, without additional skin concerns.    Physical Exam:  Vitals: There were no vitals taken for this visit.  SKIN: Total skin excluding the undergarment areas was performed. The exam included the head/face, neck, both arms, chest, back, abdomen, both legs, digits and/or nails.   - Multiple regular and uniform medium brown pigmented macules and papules are identified on the trunk, back, and extremities.   - No other lesions of concern on areas examined.     Medications:  Current Outpatient Medications   Medication Sig Dispense Refill    Calcium-Magnesium-Vitamin D (CALCIUM 500 PO) Take 1 tablet by mouth daily      fexofenadine (ALLEGRA) 180 MG tablet Take 180 mg by mouth daily      predniSONE (DELTASONE) 10 MG tablet Take 1 tablet (10 mg) by mouth daily As needed for allergic 30 tablet 1    progesterone vaginal cream       triamcinolone (KENALOG) 0.1 % external cream Apply topically 2 times daily 30 g 1    Vitamin D3 (CHOLECALCIFEROL) 125 MCG (5000 UT) tablet Take by mouth daily       No current facility-administered medications for this visit.      Past Medical History:   Patient Active Problem List   Diagnosis    Frequent UTI    Dysmenorrhea    Heterozygous factor V Leiden mutation (H)    Nonintractable migraine, unspecified migraine type    Family history of malignant melanoma    Family history of colon cancer     Past Medical History:   Diagnosis Date    Fibroid uterus         CC Referred Self, MD  No address on file on close of this encounter.

## 2024-11-07 NOTE — NURSING NOTE
Dermatology Rooming Note    Paula Larson's goals for this visit include:   Chief Complaint   Patient presents with    Skin Check     Paula is here today for a full body skin examination. No new concerns. Family hx of melanoma.     Juan FUENTES CMA

## 2024-11-08 ENCOUNTER — OFFICE VISIT (OUTPATIENT)
Dept: INTERNAL MEDICINE | Facility: CLINIC | Age: 39
End: 2024-11-08
Payer: COMMERCIAL

## 2024-11-08 ENCOUNTER — ANCILLARY PROCEDURE (OUTPATIENT)
Dept: GENERAL RADIOLOGY | Facility: CLINIC | Age: 39
End: 2024-11-08
Attending: INTERNAL MEDICINE
Payer: COMMERCIAL

## 2024-11-08 VITALS
OXYGEN SATURATION: 99 % | DIASTOLIC BLOOD PRESSURE: 88 MMHG | SYSTOLIC BLOOD PRESSURE: 132 MMHG | HEIGHT: 66 IN | WEIGHT: 135 LBS | RESPIRATION RATE: 16 BRPM | BODY MASS INDEX: 21.69 KG/M2 | HEART RATE: 80 BPM | TEMPERATURE: 97.8 F

## 2024-11-08 DIAGNOSIS — J98.8 RESPIRATORY INFECTION: ICD-10-CM

## 2024-11-08 DIAGNOSIS — J98.8 RESPIRATORY INFECTION: Primary | ICD-10-CM

## 2024-11-08 PROCEDURE — G2211 COMPLEX E/M VISIT ADD ON: HCPCS | Performed by: INTERNAL MEDICINE

## 2024-11-08 PROCEDURE — 99214 OFFICE O/P EST MOD 30 MIN: CPT | Performed by: INTERNAL MEDICINE

## 2024-11-08 PROCEDURE — 87798 DETECT AGENT NOS DNA AMP: CPT | Performed by: INTERNAL MEDICINE

## 2024-11-08 PROCEDURE — 71046 X-RAY EXAM CHEST 2 VIEWS: CPT | Mod: TC | Performed by: RADIOLOGY

## 2024-11-08 RX ORDER — AZITHROMYCIN 250 MG/1
TABLET, FILM COATED ORAL
Qty: 6 TABLET | Refills: 0 | Status: SHIPPED | OUTPATIENT
Start: 2024-11-08 | End: 2024-11-13

## 2024-11-08 ASSESSMENT — PAIN SCALES - GENERAL: PAINLEVEL_OUTOF10: MILD PAIN (2)

## 2024-11-08 NOTE — PROGRESS NOTES
"  Office Visit - Follow Up   Paula Larson   38 year old female    Date of Visit: 11/8/2024    Chief Complaint   Patient presents with    Consult     Day 14 of respiratory symptoms. Frequent cough with very colorful mucous production. Extreme fatigue. Third party telehealth provider suggested I should be seen in person to evaluate for possible pneumonia. Mild chest heaviness in the las 48 hours.        Assessment and Plan   1. Respiratory infection (Primary)  Over 2 weeks, so I am not sure how much benefit there is an checking for COVID or influenza.  Certainly could fit with pertussis and we will check for this.  Will obtain an x-ray in case I am not hearing a pneumonia on examination and treat with azithromycin especially with concern for possible pertussis.  - B. pertussis/parapertussis PCR-NP  - XR Chest 2 Views; Future  - azithromycin (ZITHROMAX) 250 MG tablet; Take 2 tablets (500 mg) by mouth daily for 1 day, THEN 1 tablet (250 mg) daily for 4 days.  Dispense: 6 tablet; Refill: 0    Return in about 2 weeks (around 11/22/2024) for Office visit, if symptoms worsen/fail to improve.     History of Present Illness   This 38 year old woman comes in with over 2 weeks of respiratory infection.  It started with a significantly sore throat.  This was for about 4 days and then she developed a productive cough of inflammatory sputum, fever, some dyspnea.  The symptoms have persisted.  She had a COVID test at home that was negative       Physical Exam   General Appearance:   No acute distress    /88   Pulse 80   Temp 97.8  F (36.6  C) (Tympanic)   Resp 16   Ht 1.676 m (5' 5.98\")   Wt 61.2 kg (135 lb)   LMP 10/26/2024 (Approximate)   SpO2 99%   BMI 21.80 kg/m      HEENT exam is generally unremarkable neck examination unremarkable, cardiovascular regular rate and rhythm no murmur gallop or rub lungs are clear to auscultation bilaterally     Additional Information   Current Outpatient Medications   Medication " Sig Dispense Refill    azithromycin (ZITHROMAX) 250 MG tablet Take 2 tablets (500 mg) by mouth daily for 1 day, THEN 1 tablet (250 mg) daily for 4 days. 6 tablet 0    Calcium-Magnesium-Vitamin D (CALCIUM 500 PO) Take 1 tablet by mouth daily      fexofenadine (ALLEGRA) 180 MG tablet Take 180 mg by mouth daily      predniSONE (DELTASONE) 10 MG tablet Take 1 tablet (10 mg) by mouth daily As needed for allergic 30 tablet 1    progesterone vaginal cream       triamcinolone (KENALOG) 0.1 % external cream Apply topically 2 times daily 30 g 1    Vitamin D3 (CHOLECALCIFEROL) 125 MCG (5000 UT) tablet Take by mouth daily         Time:     The longitudinal plan of care for the diagnosis(es)/condition(s) as documented were addressed during this visit. Due to the added complexity in care, I will continue to support Paula in the subsequent management and with ongoing continuity of care.     Siomn Kendrick MD  Answers submitted by the patient for this visit:  General Questionnaire (Submitted on 11/7/2024)  Chief Complaint: Chronic problems general questions HPI Form  How many days per week do you miss taking your medication?: 0  General Concern (Submitted on 11/7/2024)  Chief Complaint: Chronic problems general questions HPI Form  What is the reason for your visit today?: Persistent productive cough with fatigue  When did your symptoms begin?: 1-2 weeks ago  What are your symptoms?: Productive cough with colorful mucous, fatigue, sweating.  How would you describe these symptoms?: Severe  Are your symptoms:: Staying the same  Have you had these symptoms before?: No  Is there anything that makes you feel worse?: Laying down (cough worsens)  Is there anything that makes you feel better?: No  Questionnaire about: Chronic problems general questions HPI Form (Submitted on 11/7/2024)  Chief Complaint: Chronic problems general questions HPI Form

## 2024-11-09 LAB
B PARAPERT DNA SPEC QL NAA+PROBE: NOT DETECTED
B PERT DNA SPEC QL NAA+PROBE: NOT DETECTED

## 2025-01-17 PROBLEM — D12.6 ADENOMATOUS POLYP OF COLON: Status: ACTIVE | Noted: 2025-01-17

## 2025-01-20 ENCOUNTER — PATIENT OUTREACH (OUTPATIENT)
Dept: GASTROENTEROLOGY | Facility: CLINIC | Age: 40
End: 2025-01-20
Payer: COMMERCIAL

## 2025-08-05 ENCOUNTER — PATIENT OUTREACH (OUTPATIENT)
Dept: CARE COORDINATION | Facility: CLINIC | Age: 40
End: 2025-08-05
Payer: COMMERCIAL